# Patient Record
Sex: MALE | Race: BLACK OR AFRICAN AMERICAN | NOT HISPANIC OR LATINO
[De-identification: names, ages, dates, MRNs, and addresses within clinical notes are randomized per-mention and may not be internally consistent; named-entity substitution may affect disease eponyms.]

---

## 2017-03-15 ENCOUNTER — TRANSCRIPTION ENCOUNTER (OUTPATIENT)
Age: 37
End: 2017-03-15

## 2017-07-25 ENCOUNTER — TRANSCRIPTION ENCOUNTER (OUTPATIENT)
Age: 37
End: 2017-07-25

## 2019-04-20 ENCOUNTER — EMERGENCY (EMERGENCY)
Facility: HOSPITAL | Age: 39
LOS: 1 days | Discharge: HOME | End: 2019-04-20
Payer: COMMERCIAL

## 2019-04-20 VITALS
SYSTOLIC BLOOD PRESSURE: 150 MMHG | RESPIRATION RATE: 14 BRPM | HEART RATE: 77 BPM | TEMPERATURE: 99 F | OXYGEN SATURATION: 99 % | DIASTOLIC BLOOD PRESSURE: 100 MMHG

## 2019-04-20 LAB
ALBUMIN SERPL ELPH-MCNC: 4.3 G/DL — SIGNIFICANT CHANGE UP (ref 3.3–5)
ALP SERPL-CCNC: 36 U/L — LOW (ref 40–120)
ALT FLD-CCNC: 129 U/L — HIGH (ref 10–45)
ANION GAP SERPL CALC-SCNC: 12 MMOL/L — SIGNIFICANT CHANGE UP (ref 5–17)
AST SERPL-CCNC: 99 U/L — HIGH (ref 10–40)
BASOPHILS # BLD AUTO: 0.1 K/UL — SIGNIFICANT CHANGE UP (ref 0–0.2)
BASOPHILS NFR BLD AUTO: 0.9 % — SIGNIFICANT CHANGE UP (ref 0–2)
BILIRUB SERPL-MCNC: 0.6 MG/DL — SIGNIFICANT CHANGE UP (ref 0.2–1.2)
BUN SERPL-MCNC: 7 MG/DL — SIGNIFICANT CHANGE UP (ref 7–23)
CALCIUM SERPL-MCNC: 9.5 MG/DL — SIGNIFICANT CHANGE UP (ref 8.4–10.5)
CHLORIDE SERPL-SCNC: 102 MMOL/L — SIGNIFICANT CHANGE UP (ref 96–108)
CO2 SERPL-SCNC: 22 MMOL/L — SIGNIFICANT CHANGE UP (ref 22–31)
CREAT SERPL-MCNC: 0.78 MG/DL — SIGNIFICANT CHANGE UP (ref 0.5–1.3)
EOSINOPHIL # BLD AUTO: 0.3 K/UL — SIGNIFICANT CHANGE UP (ref 0–0.5)
EOSINOPHIL NFR BLD AUTO: 4.1 % — SIGNIFICANT CHANGE UP (ref 0–6)
GLUCOSE SERPL-MCNC: 127 MG/DL — HIGH (ref 70–99)
HCT VFR BLD CALC: 41.9 % — SIGNIFICANT CHANGE UP (ref 39–50)
HGB BLD-MCNC: 13.9 G/DL — SIGNIFICANT CHANGE UP (ref 13–17)
LYMPHOCYTES # BLD AUTO: 2.4 K/UL — SIGNIFICANT CHANGE UP (ref 1–3.3)
LYMPHOCYTES # BLD AUTO: 36.4 % — SIGNIFICANT CHANGE UP (ref 13–44)
MCHC RBC-ENTMCNC: 32.2 PG — SIGNIFICANT CHANGE UP (ref 27–34)
MCHC RBC-ENTMCNC: 33.1 GM/DL — SIGNIFICANT CHANGE UP (ref 32–36)
MCV RBC AUTO: 97.4 FL — SIGNIFICANT CHANGE UP (ref 80–100)
MONOCYTES # BLD AUTO: 0.5 K/UL — SIGNIFICANT CHANGE UP (ref 0–0.9)
MONOCYTES NFR BLD AUTO: 7.3 % — SIGNIFICANT CHANGE UP (ref 2–14)
NEUTROPHILS # BLD AUTO: 3.4 K/UL — SIGNIFICANT CHANGE UP (ref 1.8–7.4)
NEUTROPHILS NFR BLD AUTO: 51.3 % — SIGNIFICANT CHANGE UP (ref 43–77)
PLATELET # BLD AUTO: 223 K/UL — SIGNIFICANT CHANGE UP (ref 150–400)
POTASSIUM SERPL-MCNC: 3.6 MMOL/L — SIGNIFICANT CHANGE UP (ref 3.5–5.3)
POTASSIUM SERPL-SCNC: 3.6 MMOL/L — SIGNIFICANT CHANGE UP (ref 3.5–5.3)
PROT SERPL-MCNC: 7.4 G/DL — SIGNIFICANT CHANGE UP (ref 6–8.3)
RBC # BLD: 4.29 M/UL — SIGNIFICANT CHANGE UP (ref 4.2–5.8)
RBC # FLD: 12.7 % — SIGNIFICANT CHANGE UP (ref 10.3–14.5)
SODIUM SERPL-SCNC: 136 MMOL/L — SIGNIFICANT CHANGE UP (ref 135–145)
WBC # BLD: 6.7 K/UL — SIGNIFICANT CHANGE UP (ref 3.8–10.5)
WBC # FLD AUTO: 6.7 K/UL — SIGNIFICANT CHANGE UP (ref 3.8–10.5)

## 2019-04-20 PROCEDURE — 71250 CT THORAX DX C-: CPT | Mod: 26

## 2019-04-20 PROCEDURE — 93010 ELECTROCARDIOGRAM REPORT: CPT

## 2019-04-20 PROCEDURE — 99285 EMERGENCY DEPT VISIT HI MDM: CPT | Mod: 25

## 2019-04-20 RX ORDER — MORPHINE SULFATE 50 MG/1
8 CAPSULE, EXTENDED RELEASE ORAL ONCE
Qty: 0 | Refills: 0 | Status: DISCONTINUED | OUTPATIENT
Start: 2019-04-20 | End: 2019-04-20

## 2019-04-20 RX ORDER — KETOROLAC TROMETHAMINE 30 MG/ML
30 SYRINGE (ML) INJECTION ONCE
Qty: 0 | Refills: 0 | Status: DISCONTINUED | OUTPATIENT
Start: 2019-04-20 | End: 2019-04-20

## 2019-04-20 RX ORDER — MORPHINE SULFATE 50 MG/1
4 CAPSULE, EXTENDED RELEASE ORAL ONCE
Qty: 0 | Refills: 0 | Status: DISCONTINUED | OUTPATIENT
Start: 2019-04-20 | End: 2019-04-20

## 2019-04-20 RX ORDER — LIDOCAINE 4 G/100G
1 CREAM TOPICAL ONCE
Qty: 0 | Refills: 0 | Status: COMPLETED | OUTPATIENT
Start: 2019-04-20 | End: 2019-04-20

## 2019-04-20 RX ORDER — IBUPROFEN 200 MG
600 TABLET ORAL ONCE
Qty: 0 | Refills: 0 | Status: COMPLETED | OUTPATIENT
Start: 2019-04-20 | End: 2019-04-20

## 2019-04-20 RX ADMIN — Medication 30 MILLIGRAM(S): at 21:59

## 2019-04-20 RX ADMIN — LIDOCAINE 1 PATCH: 4 CREAM TOPICAL at 21:44

## 2019-04-20 RX ADMIN — MORPHINE SULFATE 4 MILLIGRAM(S): 50 CAPSULE, EXTENDED RELEASE ORAL at 20:15

## 2019-04-20 RX ADMIN — MORPHINE SULFATE 4 MILLIGRAM(S): 50 CAPSULE, EXTENDED RELEASE ORAL at 20:00

## 2019-04-20 RX ADMIN — Medication 30 MILLIGRAM(S): at 21:47

## 2019-04-20 RX ADMIN — MORPHINE SULFATE 8 MILLIGRAM(S): 50 CAPSULE, EXTENDED RELEASE ORAL at 22:00

## 2019-04-20 RX ADMIN — MORPHINE SULFATE 8 MILLIGRAM(S): 50 CAPSULE, EXTENDED RELEASE ORAL at 21:42

## 2019-04-20 NOTE — ED PROVIDER NOTE - NSFOLLOWUPINSTRUCTIONS_ED_ALL_ED_FT
1) You suffered from a broken rib 8th rib on the left side most likely as a result of your significant cough. Please continue to control your pain with tylenol and ibuprofen. If you are not getting relief with that please take oxycodone 1 pill, do not take more than 10 mg oxycodone within 1 day.  2) Please continue to use the incentive spirometer daily to improve your pain symptoms  3) Please follow up with your PCP within 1 week from discharge. If you develop shortness of breath, worsening chest pain please return to the ED for further evaluation.

## 2019-04-20 NOTE — ED PROVIDER NOTE - NS ED ROS FT
CONSTITUTIONAL: No weakness, fevers or chills  EYES/ENT: No visual changes;  No vertigo or throat pain, +cough  NECK: No pain or stiffness  RESPIRATORY: No cough, wheezing, hemoptysis; No shortness of breath  CARDIOVASCULAR: +chest pain or palpitations  GASTROINTESTINAL: No abdominal or epigastric pain. No nausea, vomiting, or hematemesis; No diarrhea or constipation. No melena or hematochezia.  GENITOURINARY: No dysuria, frequency or hematuria  NEUROLOGICAL: No numbness or weakness  SKIN: No itching, burning, rashes, or lesions   All other review of systems is negative unless indicated above.

## 2019-04-20 NOTE — ED ADULT NURSE NOTE - OBJECTIVE STATEMENT
Patient is a 39 year old male complaining of L. sided mix axillary chest pain, brought on suddenly by coughing. Arrived by EMS from work. Patient has history of HTN. Patient is A&O x 4 and appears well. Pt reports he just finished a course of ABX for PNA, ha a strong cough at work and felt "a pop in the L side of his chest", felt sudden and persistent sharp 10/10 pain in the L chest and fell to the ground. Pt received 50 mcg of Fentanyl from EMS. Endorsing complaints of same pain with no change after Fentanyl. Denies complaints of sob, fevers, chills, n/v/d, headache, syncope, burning urination, blood in urine, blood in stool. Lungs are clear and equal prerna. Abd is soft, non tender, non distended. Skin is warm and dry. Color is consistent with ethnicity. VSS/ NAD. Safety and comfort maintained. No visitors at the bedside. Will continue to monitor.

## 2019-04-20 NOTE — ED PROVIDER NOTE - OBJECTIVE STATEMENT
38 yo man PMH of HTN p/w chest pain and cough. 38 yo man PMH of HTN p/w chest pain and cough. Pt in usual state of health 2 days prior, has productive cough, intermittent chills. Yesterday pt reports developing left sided chest pain, initially non-radiating, pain worsened this morning, coughed and "felt a pop" w pain radiating down left arm. Pt fell to floor, no LOC and no head trauma, at that point EMS called bringing to [facility] for evaluation. Pt denies fevers, abdominal pain, no N/V or abdominal pain, no motor or sensory deficits. No recent trauma/fall. Worsening SOB today. No leg swelling, no known hypercoagulable history, no recent surgery.

## 2019-04-20 NOTE — ED PROVIDER NOTE - CLINICAL SUMMARY MEDICAL DECISION MAKING FREE TEXT BOX
38 yo man PMH of HTN p/w chest pain in setting of recent cough, intermittent chills, no fever. Worsening left sided pain radiating down left arm w/ SOB today. On exam lungs CTABL, S1 S2, abd soft, NT, ND. EKG NSR no ST changes, CXR. Reassess.

## 2019-04-20 NOTE — ED PROVIDER NOTE - ATTENDING CONTRIBUTION TO CARE
MD Ivy:  patient seen and evaluated with the resident.  I was present for key portions of the History & Physical, and I agree with the Impression & Plan.  MD Ivy:  40 yo M c/o sudden-onset L sided CP.  Reports being Tx'd for PNA; finished abx 4d ago.  Quality of pain: sharp and pleuritic.  Context:  I've had a cough for 2 weeks, and I felt a pop in the L chest with subsequent severe pain.  Associated Sx:  mild SOB, but no f/c, no n/v, no back pain, no HA.    VS:  O2 sat 92% on RA.  VS: wnl.  Physical Exam: adult M, uncomfortable appearing, NCAT, PERRL, EOMI, neck supple, RRR, CTA B, Abd: s/nd/nt, Ext: no edema, Neuro: AAOx3, ambulates w/o diff, strength 5/5 & symmetric throughout.  Impression:  DDx includes spontaneous PTX, recurrent PNA, rib Fx, >>> PE (but no PE RFs).  Plan:  cxr, labs, pain meds, ECG (unremarkable).  CT chest non-con if CXR wnl.

## 2019-04-21 VITALS
DIASTOLIC BLOOD PRESSURE: 96 MMHG | SYSTOLIC BLOOD PRESSURE: 142 MMHG | HEART RATE: 80 BPM | RESPIRATION RATE: 18 BRPM | OXYGEN SATURATION: 100 %

## 2019-04-21 PROCEDURE — 71250 CT THORAX DX C-: CPT

## 2019-04-21 PROCEDURE — 96376 TX/PRO/DX INJ SAME DRUG ADON: CPT

## 2019-04-21 PROCEDURE — 85027 COMPLETE CBC AUTOMATED: CPT

## 2019-04-21 PROCEDURE — 96374 THER/PROPH/DIAG INJ IV PUSH: CPT

## 2019-04-21 PROCEDURE — 96375 TX/PRO/DX INJ NEW DRUG ADDON: CPT

## 2019-04-21 PROCEDURE — 93005 ELECTROCARDIOGRAM TRACING: CPT

## 2019-04-21 PROCEDURE — 80053 COMPREHEN METABOLIC PANEL: CPT

## 2019-04-21 PROCEDURE — 99284 EMERGENCY DEPT VISIT MOD MDM: CPT | Mod: 25

## 2019-04-21 RX ORDER — OXYCODONE HYDROCHLORIDE 5 MG/1
5 TABLET ORAL ONCE
Qty: 0 | Refills: 0 | Status: DISCONTINUED | OUTPATIENT
Start: 2019-04-21 | End: 2019-04-21

## 2019-04-21 RX ORDER — LIDOCAINE 4 G/100G
1 CREAM TOPICAL
Qty: 1 | Refills: 0
Start: 2019-04-21 | End: 2019-04-25

## 2019-04-21 RX ORDER — OXYCODONE HYDROCHLORIDE 5 MG/1
1 TABLET ORAL
Qty: 6 | Refills: 0
Start: 2019-04-21 | End: 2019-04-23

## 2019-04-21 RX ADMIN — Medication 600 MILLIGRAM(S): at 00:20

## 2019-04-21 RX ADMIN — Medication 600 MILLIGRAM(S): at 01:34

## 2019-04-21 RX ADMIN — OXYCODONE HYDROCHLORIDE 5 MILLIGRAM(S): 5 TABLET ORAL at 01:35

## 2019-05-14 ENCOUNTER — APPOINTMENT (OUTPATIENT)
Dept: FAMILY MEDICINE | Facility: CLINIC | Age: 39
End: 2019-05-14

## 2019-05-14 PROBLEM — Z00.00 ENCOUNTER FOR PREVENTIVE HEALTH EXAMINATION: Status: ACTIVE | Noted: 2019-05-14

## 2019-05-15 ENCOUNTER — APPOINTMENT (OUTPATIENT)
Dept: FAMILY MEDICINE | Facility: CLINIC | Age: 39
End: 2019-05-15

## 2019-05-15 ENCOUNTER — APPOINTMENT (OUTPATIENT)
Dept: FAMILY MEDICINE | Facility: CLINIC | Age: 39
End: 2019-05-15
Payer: COMMERCIAL

## 2019-05-15 VITALS — SYSTOLIC BLOOD PRESSURE: 135 MMHG | DIASTOLIC BLOOD PRESSURE: 84 MMHG | HEART RATE: 86 BPM

## 2019-05-15 VITALS — TEMPERATURE: 99.7 F | HEIGHT: 69 IN | BODY MASS INDEX: 34.96 KG/M2 | WEIGHT: 236 LBS

## 2019-05-15 DIAGNOSIS — S22.39XA FRACTURE OF ONE RIB, UNSPECIFIED SIDE, INITIAL ENCOUNTER FOR CLOSED FRACTURE: ICD-10-CM

## 2019-05-15 PROCEDURE — 99213 OFFICE O/P EST LOW 20 MIN: CPT

## 2019-05-15 RX ORDER — PREDNISONE 10 MG/1
10 TABLET ORAL
Qty: 15 | Refills: 0 | Status: DISCONTINUED | COMMUNITY
Start: 2019-01-29

## 2019-05-15 RX ORDER — FEXOFENADINE HYDROCHLORIDE 180 MG/1
180 TABLET ORAL
Qty: 30 | Refills: 0 | Status: DISCONTINUED | COMMUNITY
Start: 2019-01-29

## 2019-05-15 RX ORDER — AMOXICILLIN 500 MG/1
500 CAPSULE ORAL
Qty: 21 | Refills: 0 | Status: DISCONTINUED | COMMUNITY
Start: 2019-03-13

## 2019-05-15 RX ORDER — ALBUTEROL SULFATE 90 UG/1
108 (90 BASE) AEROSOL, METERED RESPIRATORY (INHALATION)
Qty: 8 | Refills: 0 | Status: DISCONTINUED | COMMUNITY
Start: 2019-01-29

## 2019-05-15 RX ORDER — OXYCODONE 5 MG/1
5 TABLET ORAL
Qty: 6 | Refills: 0 | Status: DISCONTINUED | COMMUNITY
Start: 2019-04-21

## 2019-05-15 RX ORDER — LISINOPRIL AND HYDROCHLOROTHIAZIDE TABLETS 20; 12.5 MG/1; MG/1
20-12.5 TABLET ORAL
Qty: 30 | Refills: 0 | Status: DISCONTINUED | COMMUNITY
Start: 2019-03-25

## 2019-05-15 RX ORDER — TRAMADOL HYDROCHLORIDE AND ACETAMINOPHEN 37.5; 325 MG/1; MG/1
37.5-325 TABLET, FILM COATED ORAL
Qty: 50 | Refills: 0 | Status: DISCONTINUED | COMMUNITY
Start: 2019-04-23

## 2019-05-15 RX ORDER — AZITHROMYCIN 250 MG/1
250 TABLET, FILM COATED ORAL
Qty: 6 | Refills: 0 | Status: DISCONTINUED | COMMUNITY
Start: 2019-01-29

## 2019-05-15 RX ORDER — HYDROCODONE BITARTRATE AND ACETAMINOPHEN 5; 325 MG/1; MG/1
5-325 TABLET ORAL
Qty: 12 | Refills: 0 | Status: DISCONTINUED | COMMUNITY
Start: 2019-03-13

## 2019-05-15 NOTE — PLAN
[FreeTextEntry1] : pt may go back to work but light duty x2weeks but xray negative but mri according to pt was positive\par

## 2019-05-15 NOTE — HISTORY OF PRESENT ILLNESS
[de-identified] : pt fracture left 8th rib decreasing pain but suffering from numbness around left 8th rib [FreeTextEntry1] : pt is here for result and note  to go back to work

## 2019-05-15 NOTE — PHYSICAL EXAM
[Well Developed] : well developed [No Acute Distress] : no acute distress [Well Nourished] : well nourished [PERRL] : pupils equal round and reactive to light [Normal Sclera/Conjunctiva] : normal sclera/conjunctiva [Well-Appearing] : well-appearing [Normal Outer Ear/Nose] : the outer ears and nose were normal in appearance [EOMI] : extraocular movements intact [Normal Oropharynx] : the oropharynx was normal [No Lymphadenopathy] : no lymphadenopathy [No JVD] : no jugular venous distention [Supple] : supple [Clear to Auscultation] : lungs were clear to auscultation bilaterally [No Respiratory Distress] : no respiratory distress  [Thyroid Normal, No Nodules] : the thyroid was normal and there were no nodules present [No Accessory Muscle Use] : no accessory muscle use [Normal Rate] : normal rate  [Regular Rhythm] : with a regular rhythm [Normal S1, S2] : normal S1 and S2 [No Carotid Bruits] : no carotid bruits [No Murmur] : no murmur heard [No Varicosities] : no varicosities [Pedal Pulses Present] : the pedal pulses are present [No Abdominal Bruit] : a ~M bruit was not heard ~T in the abdomen [No Edema] : there was no peripheral edema [No Extremity Clubbing/Cyanosis] : no extremity clubbing/cyanosis [No Palpable Aorta] : no palpable aorta [Soft] : abdomen soft [Non Tender] : non-tender [Non-distended] : non-distended [No Masses] : no abdominal mass palpated [No HSM] : no HSM [Normal Bowel Sounds] : normal bowel sounds [Normal Anterior Cervical Nodes] : no anterior cervical lymphadenopathy [No CVA Tenderness] : no CVA  tenderness [Normal Posterior Cervical Nodes] : no posterior cervical lymphadenopathy [No Joint Swelling] : no joint swelling [Grossly Normal Strength/Tone] : grossly normal strength/tone [No Spinal Tenderness] : no spinal tenderness [Normal Gait] : normal gait [No Rash] : no rash [Coordination Grossly Intact] : coordination grossly intact [No Focal Deficits] : no focal deficits [Deep Tendon Reflexes (DTR)] : deep tendon reflexes were 2+ and symmetric [Normal Affect] : the affect was normal [Normal Insight/Judgement] : insight and judgment were intact

## 2019-05-20 ENCOUNTER — TRANSCRIPTION ENCOUNTER (OUTPATIENT)
Age: 39
End: 2019-05-20

## 2019-07-03 ENCOUNTER — APPOINTMENT (OUTPATIENT)
Dept: FAMILY MEDICINE | Facility: CLINIC | Age: 39
End: 2019-07-03
Payer: COMMERCIAL

## 2019-07-03 VITALS
SYSTOLIC BLOOD PRESSURE: 128 MMHG | WEIGHT: 236 LBS | DIASTOLIC BLOOD PRESSURE: 80 MMHG | HEART RATE: 118 BPM | BODY MASS INDEX: 34.96 KG/M2 | TEMPERATURE: 97.8 F | HEIGHT: 69 IN | OXYGEN SATURATION: 96 %

## 2019-07-03 DIAGNOSIS — R05 COUGH: ICD-10-CM

## 2019-07-03 DIAGNOSIS — Z87.09 PERSONAL HISTORY OF OTHER DISEASES OF THE RESPIRATORY SYSTEM: ICD-10-CM

## 2019-07-03 PROCEDURE — 99214 OFFICE O/P EST MOD 30 MIN: CPT

## 2019-07-03 RX ORDER — MONTELUKAST 10 MG/1
10 TABLET, FILM COATED ORAL
Qty: 90 | Refills: 0 | Status: ACTIVE | COMMUNITY
Start: 2019-07-03 | End: 1900-01-01

## 2019-07-03 RX ORDER — ALBUTEROL SULFATE 90 UG/1
108 (90 BASE) AEROSOL, METERED RESPIRATORY (INHALATION)
Qty: 1 | Refills: 0 | Status: ACTIVE | COMMUNITY
Start: 2019-07-03 | End: 1900-01-01

## 2019-07-03 RX ORDER — AZITHROMYCIN 250 MG/1
250 TABLET, FILM COATED ORAL
Qty: 1 | Refills: 0 | Status: ACTIVE | COMMUNITY
Start: 2019-07-03 | End: 1900-01-01

## 2019-07-03 NOTE — PHYSICAL EXAM
[No Acute Distress] : no acute distress [Well Nourished] : well nourished [Well Developed] : well developed [Well-Appearing] : well-appearing [Normal Sclera/Conjunctiva] : normal sclera/conjunctiva [PERRL] : pupils equal round and reactive to light [EOMI] : extraocular movements intact [Normal Outer Ear/Nose] : the outer ears and nose were normal in appearance [Normal Oropharynx] : the oropharynx was normal [No JVD] : no jugular venous distention [No Lymphadenopathy] : no lymphadenopathy [Thyroid Normal, No Nodules] : the thyroid was normal and there were no nodules present [Supple] : supple [No Respiratory Distress] : no respiratory distress  [No Accessory Muscle Use] : no accessory muscle use [Clear to Auscultation] : lungs were clear to auscultation bilaterally [Regular Rhythm] : with a regular rhythm [Normal Rate] : normal rate  [Normal S1, S2] : normal S1 and S2 [No Carotid Bruits] : no carotid bruits [No Murmur] : no murmur heard [No Abdominal Bruit] : a ~M bruit was not heard ~T in the abdomen [No Varicosities] : no varicosities [Pedal Pulses Present] : the pedal pulses are present [No Palpable Aorta] : no palpable aorta [No Edema] : there was no peripheral edema [No Extremity Clubbing/Cyanosis] : no extremity clubbing/cyanosis [Soft] : abdomen soft [Non Tender] : non-tender [No Masses] : no abdominal mass palpated [Non-distended] : non-distended [No HSM] : no HSM [Normal Bowel Sounds] : normal bowel sounds [Normal Posterior Cervical Nodes] : no posterior cervical lymphadenopathy [Normal Anterior Cervical Nodes] : no anterior cervical lymphadenopathy [No CVA Tenderness] : no CVA  tenderness [No Spinal Tenderness] : no spinal tenderness [No Joint Swelling] : no joint swelling [Grossly Normal Strength/Tone] : grossly normal strength/tone [No Rash] : no rash [Coordination Grossly Intact] : coordination grossly intact [No Focal Deficits] : no focal deficits [Normal Gait] : normal gait [Deep Tendon Reflexes (DTR)] : deep tendon reflexes were 2+ and symmetric [Normal Affect] : the affect was normal [Normal Insight/Judgement] : insight and judgment were intact

## 2019-07-03 NOTE — HISTORY OF PRESENT ILLNESS
[FreeTextEntry8] : pt has prior rib pain and now a cough for 5 day\par History of fractured ribs April 2019, here today complaints of coughing for 5 days, wheezing no fever

## 2020-01-24 NOTE — ED ADULT NURSE NOTE - CAS DISCH TRANSFER METHOD
[History reviewed] : History reviewed. [Medications and Allergies reviewed] : Medications and allergies reviewed. Private car

## 2020-02-14 ENCOUNTER — TRANSCRIPTION ENCOUNTER (OUTPATIENT)
Age: 40
End: 2020-02-14

## 2020-04-29 ENCOUNTER — APPOINTMENT (OUTPATIENT)
Dept: FAMILY MEDICINE | Facility: CLINIC | Age: 40
End: 2020-04-29

## 2020-12-21 PROBLEM — Z87.09 HISTORY OF UPPER RESPIRATORY INFECTION: Status: RESOLVED | Noted: 2019-07-03 | Resolved: 2020-12-21

## 2021-05-12 ENCOUNTER — INPATIENT (INPATIENT)
Facility: HOSPITAL | Age: 41
LOS: 2 days | Discharge: HOME | DRG: 639 | End: 2021-05-15
Attending: HOSPITALIST | Admitting: INTERNAL MEDICINE
Payer: COMMERCIAL

## 2021-05-12 ENCOUNTER — APPOINTMENT (OUTPATIENT)
Dept: FAMILY MEDICINE | Facility: CLINIC | Age: 41
End: 2021-05-12
Payer: COMMERCIAL

## 2021-05-12 VITALS — DIASTOLIC BLOOD PRESSURE: 75 MMHG | HEART RATE: 100 BPM | SYSTOLIC BLOOD PRESSURE: 120 MMHG

## 2021-05-12 VITALS
WEIGHT: 253.09 LBS | SYSTOLIC BLOOD PRESSURE: 126 MMHG | TEMPERATURE: 98 F | HEIGHT: 69 IN | DIASTOLIC BLOOD PRESSURE: 88 MMHG | RESPIRATION RATE: 18 BRPM | OXYGEN SATURATION: 99 % | HEART RATE: 99 BPM

## 2021-05-12 VITALS
WEIGHT: 258.4 LBS | HEART RATE: 120 BPM | BODY MASS INDEX: 38.27 KG/M2 | OXYGEN SATURATION: 98 % | TEMPERATURE: 97.7 F | HEIGHT: 69 IN

## 2021-05-12 DIAGNOSIS — E11.49 TYPE 2 DIABETES MELLITUS WITH OTHER DIABETIC NEUROLOGICAL COMPLICATION: ICD-10-CM

## 2021-05-12 DIAGNOSIS — R53.83 OTHER FATIGUE: ICD-10-CM

## 2021-05-12 DIAGNOSIS — E11.10 TYPE 2 DIABETES MELLITUS WITH KETOACIDOSIS WITHOUT COMA: ICD-10-CM

## 2021-05-12 DIAGNOSIS — E11.9 TYPE 2 DIABETES MELLITUS W/OUT COMPLICATIONS: ICD-10-CM

## 2021-05-12 LAB
A1C WITH ESTIMATED AVERAGE GLUCOSE RESULT: 12.5 % — HIGH (ref 4–5.6)
ACETONE SERPL-MCNC: ABNORMAL
ALBUMIN SERPL ELPH-MCNC: 5 G/DL — SIGNIFICANT CHANGE UP (ref 3.3–5.2)
ALP SERPL-CCNC: 66 U/L — SIGNIFICANT CHANGE UP (ref 40–120)
ALT FLD-CCNC: 54 U/L — HIGH
ANION GAP SERPL CALC-SCNC: 24 MMOL/L — HIGH (ref 5–17)
APPEARANCE UR: CLEAR — SIGNIFICANT CHANGE UP
AST SERPL-CCNC: 47 U/L — HIGH
BACTERIA # UR AUTO: ABNORMAL
BASE EXCESS BLDV CALC-SCNC: -3.8 MMOL/L — LOW (ref -2–2)
BASOPHILS # BLD AUTO: 0.12 K/UL — SIGNIFICANT CHANGE UP (ref 0–0.2)
BASOPHILS NFR BLD AUTO: 1.6 % — SIGNIFICANT CHANGE UP (ref 0–2)
BILIRUB SERPL-MCNC: 0.8 MG/DL — SIGNIFICANT CHANGE UP (ref 0.4–2)
BILIRUB UR-MCNC: NEGATIVE — SIGNIFICANT CHANGE UP
BUN SERPL-MCNC: 16 MG/DL — SIGNIFICANT CHANGE UP (ref 8–20)
CA-I SERPL-SCNC: 1.19 MMOL/L — SIGNIFICANT CHANGE UP (ref 1.15–1.33)
CALCIUM SERPL-MCNC: 10.4 MG/DL — HIGH (ref 8.6–10.2)
CHLORIDE BLDV-SCNC: 80 MMOL/L — LOW (ref 98–107)
CHLORIDE SERPL-SCNC: 77 MMOL/L — LOW (ref 98–107)
CO2 SERPL-SCNC: 19 MMOL/L — LOW (ref 22–29)
COLOR SPEC: YELLOW — SIGNIFICANT CHANGE UP
CREAT SERPL-MCNC: 1.16 MG/DL — SIGNIFICANT CHANGE UP (ref 0.5–1.3)
DIFF PNL FLD: NEGATIVE — SIGNIFICANT CHANGE UP
EOSINOPHIL # BLD AUTO: 0.13 K/UL — SIGNIFICANT CHANGE UP (ref 0–0.5)
EOSINOPHIL NFR BLD AUTO: 1.7 % — SIGNIFICANT CHANGE UP (ref 0–6)
EPI CELLS # UR: SIGNIFICANT CHANGE UP
ESTIMATED AVERAGE GLUCOSE: 312 MG/DL — HIGH (ref 68–114)
GAS PNL BLDV: 125 MMOL/L — LOW (ref 135–145)
GAS PNL BLDV: SIGNIFICANT CHANGE UP
GAS PNL BLDV: SIGNIFICANT CHANGE UP
GLUCOSE BLDC GLUCOMTR-MCNC: 282 MG/DL — HIGH (ref 70–99)
GLUCOSE BLDC GLUCOMTR-MCNC: 325 MG/DL — HIGH (ref 70–99)
GLUCOSE BLDC GLUCOMTR-MCNC: 360 MG/DL — HIGH (ref 70–99)
GLUCOSE BLDV-MCNC: 706 MG/DL — CRITICAL HIGH (ref 70–99)
GLUCOSE SERPL-MCNC: 683 MG/DL — CRITICAL HIGH (ref 70–99)
GLUCOSE UR QL: 1000 MG/DL
HCO3 BLDV-SCNC: 21 MMOL/L — SIGNIFICANT CHANGE UP (ref 20–26)
HCT VFR BLD CALC: 45.2 % — SIGNIFICANT CHANGE UP (ref 39–50)
HCT VFR BLDA CALC: 50 — SIGNIFICANT CHANGE UP (ref 39–50)
HGB BLD CALC-MCNC: 16.2 G/DL — SIGNIFICANT CHANGE UP (ref 13–17)
HGB BLD-MCNC: 16 G/DL — SIGNIFICANT CHANGE UP (ref 13–17)
IMM GRANULOCYTES NFR BLD AUTO: 0.1 % — SIGNIFICANT CHANGE UP (ref 0–1.5)
KETONES UR-MCNC: ABNORMAL
LACTATE BLDV-MCNC: 2.1 MMOL/L — HIGH (ref 0.5–2)
LEUKOCYTE ESTERASE UR-ACNC: NEGATIVE — SIGNIFICANT CHANGE UP
LIDOCAIN IGE QN: 51 U/L — SIGNIFICANT CHANGE UP (ref 22–51)
LYMPHOCYTES # BLD AUTO: 2.89 K/UL — SIGNIFICANT CHANGE UP (ref 1–3.3)
LYMPHOCYTES # BLD AUTO: 38.2 % — SIGNIFICANT CHANGE UP (ref 13–44)
MAGNESIUM SERPL-MCNC: 2.7 MG/DL — HIGH (ref 1.6–2.6)
MCHC RBC-ENTMCNC: 33.7 PG — SIGNIFICANT CHANGE UP (ref 27–34)
MCHC RBC-ENTMCNC: 35.4 GM/DL — SIGNIFICANT CHANGE UP (ref 32–36)
MCV RBC AUTO: 95.2 FL — SIGNIFICANT CHANGE UP (ref 80–100)
MONOCYTES # BLD AUTO: 0.79 K/UL — SIGNIFICANT CHANGE UP (ref 0–0.9)
MONOCYTES NFR BLD AUTO: 10.4 % — SIGNIFICANT CHANGE UP (ref 2–14)
NEUTROPHILS # BLD AUTO: 3.63 K/UL — SIGNIFICANT CHANGE UP (ref 1.8–7.4)
NEUTROPHILS NFR BLD AUTO: 48 % — SIGNIFICANT CHANGE UP (ref 43–77)
NITRITE UR-MCNC: NEGATIVE — SIGNIFICANT CHANGE UP
OTHER CELLS CSF MANUAL: 20 ML/DL — SIGNIFICANT CHANGE UP (ref 18–22)
PCO2 BLDV: 46 MMHG — SIGNIFICANT CHANGE UP (ref 35–50)
PH BLDV: 7.3 — LOW (ref 7.32–7.43)
PH UR: 5 — SIGNIFICANT CHANGE UP (ref 5–8)
PLATELET # BLD AUTO: 246 K/UL — SIGNIFICANT CHANGE UP (ref 150–400)
PO2 BLDV: 62 MMHG — HIGH (ref 25–45)
POTASSIUM BLDV-SCNC: 5.1 MMOL/L — HIGH (ref 3.4–4.5)
POTASSIUM SERPL-MCNC: 4.9 MMOL/L — SIGNIFICANT CHANGE UP (ref 3.5–5.3)
POTASSIUM SERPL-SCNC: 4.9 MMOL/L — SIGNIFICANT CHANGE UP (ref 3.5–5.3)
PROT SERPL-MCNC: 8.9 G/DL — HIGH (ref 6.6–8.7)
PROT UR-MCNC: 15 MG/DL
RBC # BLD: 4.75 M/UL — SIGNIFICANT CHANGE UP (ref 4.2–5.8)
RBC # FLD: 11.5 % — SIGNIFICANT CHANGE UP (ref 10.3–14.5)
RBC CASTS # UR COMP ASSIST: SIGNIFICANT CHANGE UP /HPF (ref 0–4)
SAO2 % BLDV: 89 % — SIGNIFICANT CHANGE UP
SARS-COV-2 RNA SPEC QL NAA+PROBE: SIGNIFICANT CHANGE UP
SODIUM SERPL-SCNC: 120 MMOL/L — CRITICAL LOW (ref 135–145)
SP GR SPEC: 1.01 — SIGNIFICANT CHANGE UP (ref 1.01–1.02)
UROBILINOGEN FLD QL: NEGATIVE MG/DL — SIGNIFICANT CHANGE UP
WBC # BLD: 7.57 K/UL — SIGNIFICANT CHANGE UP (ref 3.8–10.5)
WBC # FLD AUTO: 7.57 K/UL — SIGNIFICANT CHANGE UP (ref 3.8–10.5)
WBC UR QL: SIGNIFICANT CHANGE UP

## 2021-05-12 PROCEDURE — 99072 ADDL SUPL MATRL&STAF TM PHE: CPT

## 2021-05-12 PROCEDURE — 99214 OFFICE O/P EST MOD 30 MIN: CPT

## 2021-05-12 PROCEDURE — 93010 ELECTROCARDIOGRAM REPORT: CPT

## 2021-05-12 PROCEDURE — 71045 X-RAY EXAM CHEST 1 VIEW: CPT | Mod: 26

## 2021-05-12 PROCEDURE — 99285 EMERGENCY DEPT VISIT HI MDM: CPT

## 2021-05-12 RX ORDER — INSULIN HUMAN 100 [IU]/ML
5 INJECTION, SOLUTION SUBCUTANEOUS
Qty: 100 | Refills: 0 | Status: DISCONTINUED | OUTPATIENT
Start: 2021-05-12 | End: 2021-05-13

## 2021-05-12 RX ORDER — SODIUM CHLORIDE 9 MG/ML
3000 INJECTION, SOLUTION INTRAVENOUS ONCE
Refills: 0 | Status: COMPLETED | OUTPATIENT
Start: 2021-05-12 | End: 2021-05-12

## 2021-05-12 RX ORDER — ENOXAPARIN SODIUM 100 MG/ML
40 INJECTION SUBCUTANEOUS DAILY
Refills: 0 | Status: DISCONTINUED | OUTPATIENT
Start: 2021-05-12 | End: 2021-05-15

## 2021-05-12 RX ORDER — CHLORHEXIDINE GLUCONATE 213 G/1000ML
1 SOLUTION TOPICAL
Refills: 0 | Status: DISCONTINUED | OUTPATIENT
Start: 2021-05-12 | End: 2021-05-15

## 2021-05-12 RX ORDER — SODIUM CHLORIDE 9 MG/ML
1000 INJECTION, SOLUTION INTRAVENOUS
Refills: 0 | Status: DISCONTINUED | OUTPATIENT
Start: 2021-05-12 | End: 2021-05-13

## 2021-05-12 RX ORDER — INSULIN HUMAN 100 [IU]/ML
10 INJECTION, SOLUTION SUBCUTANEOUS ONCE
Refills: 0 | Status: COMPLETED | OUTPATIENT
Start: 2021-05-12 | End: 2021-05-12

## 2021-05-12 RX ADMIN — INSULIN HUMAN 11 UNIT(S)/HR: 100 INJECTION, SOLUTION SUBCUTANEOUS at 20:16

## 2021-05-12 RX ADMIN — INSULIN HUMAN 4 UNIT(S)/HR: 100 INJECTION, SOLUTION SUBCUTANEOUS at 23:11

## 2021-05-12 RX ADMIN — SODIUM CHLORIDE 200 MILLILITER(S): 9 INJECTION, SOLUTION INTRAVENOUS at 21:17

## 2021-05-12 RX ADMIN — INSULIN HUMAN 10 UNIT(S): 100 INJECTION, SOLUTION SUBCUTANEOUS at 20:16

## 2021-05-12 RX ADMIN — INSULIN HUMAN 8 UNIT(S)/HR: 100 INJECTION, SOLUTION SUBCUTANEOUS at 21:18

## 2021-05-12 RX ADMIN — SODIUM CHLORIDE 200 MILLILITER(S): 9 INJECTION, SOLUTION INTRAVENOUS at 23:11

## 2021-05-12 RX ADMIN — SODIUM CHLORIDE 3000 MILLILITER(S): 9 INJECTION, SOLUTION INTRAVENOUS at 19:17

## 2021-05-12 RX ADMIN — SODIUM CHLORIDE 3000 MILLILITER(S): 9 INJECTION, SOLUTION INTRAVENOUS at 20:10

## 2021-05-12 RX ADMIN — CHLORHEXIDINE GLUCONATE 1 APPLICATION(S): 213 SOLUTION TOPICAL at 23:11

## 2021-05-12 NOTE — H&P ADULT - NSHPREVIEWOFSYSTEMS_GEN_ALL_CORE
Review of Systems:  CONSTITUTIONAL: No fever, chills, or fatigue  EYES: No eye pain, visual disturbances, or discharge  ENMT:  No difficulty hearing, tinnitus, vertigo; No sinus or throat pain  NECK: No pain or stiffness  RESPIRATORY: No cough, wheezing, chills or hemoptysis; No shortness of breath  CARDIOVASCULAR: No chest pain, palpitations, dizziness, or leg swelling  GASTROINTESTINAL: No abdominal or epigastric pain. No nausea, vomiting, or hematemesis; No diarrhea or constipation. No melena or hematochezia.  GENITOURINARY: No dysuria, (+) frequency, No hematuria, or incontinence  NEUROLOGICAL: No headaches, memory loss, loss of strength, numbness, or tremors  SKIN: No itching, burning, rashes, or lesions   MUSCULOSKELETAL: No joint pain or swelling; No muscle, back, or extremity pain  PSYCHIATRIC: No depression, anxiety, mood swings, or difficulty sleeping

## 2021-05-12 NOTE — ED ADULT NURSE NOTE - OBJECTIVE STATEMENT
Assumed care of patient from previous RN at this time.  A&Ox4, RR even and unlabored. Skin is warm, dry and intact.  OPT sent in to ED by PCP for hyperglycemia.  In office FS read >500.  Pt reports increase fatigue/weakness, thirst and urination for the last few days. +nausea without vomiting. Denies a diagnosis of DM or other PMH.  Placed on CM, in NAD.  Fluid boluses infusing.

## 2021-05-12 NOTE — H&P ADULT - HISTORY OF PRESENT ILLNESS
41 year old male Denies any PMHx ( including DM, HTN, HLD).  Presents to ED after being seen by PMD and was found to have High Blood Sugar.  Patient states had not been feeling well with Polydipsia Polyu may and blurry vision for past week.  Patient denies any other associated symptoms and denies any SOB, CP, Fevers, Chills, UTI or URI symptoms.  Denies any Hematemesis hematochezia ort diarrhea.  IN ED found to have an ANion Gap and Blood Glucose > 600.

## 2021-05-12 NOTE — ED PROVIDER NOTE - OBJECTIVE STATEMENT
42yo M with no PMH, strong FH DM, sister  from DKA, presenting with 3-4 days of not feeling well, frequent urination, increased thirst, SOB/breathing fast. no CP/cough. +nausea/vomiting. no abd pain. no diarrhea. no alcohol use. has been getting progressively worse/more tired so went to urgent care who checked FS and too high to read.

## 2021-05-12 NOTE — H&P ADULT - ASSESSMENT
41 year old male Denies any PMHx ( including DM, HTN, HLD).  Presents to ED after being seen by PMD and was found to have High Blood Sugar.      New onset Diabetes with DKA       Admit to MICU   Unlabored on RA  HDS  EKG noted with Q waves - will check cardiac markers and trend   Consistent carb clears until Anion gap closes   Aggressive hydration with isotonic crystalloid LR   When BS < 250 will buffer IVF with dextrose   Q 1hr Fingersticks until AG closaes   Insulin infusion   DM education   DVT PPX with Lovenox   COVID-19 Screen is pending   Case D/W ICU attending Dr Hamlin       CRITICAL CARE TIME SPENT: 45 minutes   (Assessing presenting problems of acute illness, which pose high probability of life threatening deterioration or end organ damage/dysfunction, as well as medical decision making including initiating plan of care, reviewing data, reviewing radiologic exams, discussing with multidisciplinary team,  discussing goals of care with patient/family, and writing this note.  Non-inclusive of procedures performed)

## 2021-05-12 NOTE — ED PROVIDER NOTE - CLINICAL SUMMARY MEDICAL DECISION MAKING FREE TEXT BOX
patient with new onset DM, will r/o DKA, likely need admission for starting insulin. labs. ekg. cxr. urine. hydration reasses

## 2021-05-12 NOTE — ED ADULT NURSE REASSESSMENT NOTE - NS ED NURSE REASSESS COMMENT FT1
PT in NAD.  Ambulated to bathroom with steady gait.  VS WNL. Denies pain or nausea.  Insulin gtt decreased to 8 unit/hr as per DAYANARA Mock's orders for new FS of 360.  Repeat blood work sent to lab.

## 2021-05-12 NOTE — ED ADULT TRIAGE NOTE - CHIEF COMPLAINT QUOTE
was sent by MD had blood work yesterday states glucose >500, increased thirst & urination, not eating for 2 days,   A&Ox3, resp wnl was sent by MD had blood work yesterday states glucose >500, increased thirst & urination, not eating for 2 days,   A&Ox3, resp wnl, denies CP

## 2021-05-12 NOTE — H&P ADULT - NSHPPHYSICALEXAM_GEN_ALL_CORE
Physical Examination:    General:  Awake,  Alert, oriented, interactive, nonfocal    HEENT: Pupils equal, reactive to light.  Symmetric. No JVD.    PULM: Clear to auscultation bilaterally, no significant sputum production    CVS: Regular rate and rhythm, no murmurs, rubs, or gallops    ABD: Soft, nondistended, nontender, normoactive bowel sounds, no masses    EXT: No edema, nontender    SKIN: Warm and well perfused, no rashes noted.

## 2021-05-12 NOTE — HISTORY OF PRESENT ILLNESS
[Chills] : chills [Fatigue] : fatigue [Cough] : no cough [Headache] : no headache [de-identified] : gonging to the bathroom all the time to urinate [FreeTextEntry8] : pt feeling tired weight loss increase urination

## 2021-05-12 NOTE — ED ADULT NURSE NOTE - CHIEF COMPLAINT QUOTE
was sent by MD had blood work yesterday states glucose >500, increased thirst & urination, not eating for 2 days,   A&Ox3, resp wnl, denies CP

## 2021-05-12 NOTE — ED PROVIDER NOTE - NS ED ROS FT
ROS: no CP +SOB. no cough. no fever. no n/v/d/c. no abd pain. no rash. no bleeding. +urinary complaints. +weakness. no vision changes. no HA. no neck/back pain. no extremity swelling/deformity. No change in mental status.

## 2021-05-13 LAB
ANION GAP SERPL CALC-SCNC: 13 MMOL/L — SIGNIFICANT CHANGE UP (ref 5–17)
ANION GAP SERPL CALC-SCNC: 14 MMOL/L — SIGNIFICANT CHANGE UP (ref 5–17)
ANION GAP SERPL CALC-SCNC: 18 MMOL/L — HIGH (ref 5–17)
BUN SERPL-MCNC: 13 MG/DL — SIGNIFICANT CHANGE UP (ref 8–20)
BUN SERPL-MCNC: 8 MG/DL — SIGNIFICANT CHANGE UP (ref 8–20)
BUN SERPL-MCNC: 9 MG/DL — SIGNIFICANT CHANGE UP (ref 8–20)
CALCIUM SERPL-MCNC: 9.7 MG/DL — SIGNIFICANT CHANGE UP (ref 8.6–10.2)
CALCIUM SERPL-MCNC: 9.8 MG/DL — SIGNIFICANT CHANGE UP (ref 8.6–10.2)
CALCIUM SERPL-MCNC: 9.9 MG/DL — SIGNIFICANT CHANGE UP (ref 8.6–10.2)
CHLORIDE SERPL-SCNC: 89 MMOL/L — LOW (ref 98–107)
CHLORIDE SERPL-SCNC: 93 MMOL/L — LOW (ref 98–107)
CHLORIDE SERPL-SCNC: 93 MMOL/L — LOW (ref 98–107)
CHOLEST SERPL-MCNC: 228 MG/DL — HIGH
CK SERPL-CCNC: 108 U/L — SIGNIFICANT CHANGE UP (ref 30–200)
CO2 SERPL-SCNC: 20 MMOL/L — LOW (ref 22–29)
CO2 SERPL-SCNC: 26 MMOL/L — SIGNIFICANT CHANGE UP (ref 22–29)
CO2 SERPL-SCNC: 27 MMOL/L — SIGNIFICANT CHANGE UP (ref 22–29)
COVID-19 SPIKE DOMAIN AB INTERP: NEGATIVE — SIGNIFICANT CHANGE UP
COVID-19 SPIKE DOMAIN ANTIBODY RESULT: 0.4 U/ML — SIGNIFICANT CHANGE UP
CREAT SERPL-MCNC: 0.78 MG/DL — SIGNIFICANT CHANGE UP (ref 0.5–1.3)
CREAT SERPL-MCNC: 0.88 MG/DL — SIGNIFICANT CHANGE UP (ref 0.5–1.3)
CREAT SERPL-MCNC: 0.89 MG/DL — SIGNIFICANT CHANGE UP (ref 0.5–1.3)
GLUCOSE BLDC GLUCOMTR-MCNC: 228 MG/DL — HIGH (ref 70–99)
GLUCOSE BLDC GLUCOMTR-MCNC: 230 MG/DL — HIGH (ref 70–99)
GLUCOSE BLDC GLUCOMTR-MCNC: 237 MG/DL — HIGH (ref 70–99)
GLUCOSE BLDC GLUCOMTR-MCNC: 237 MG/DL — HIGH (ref 70–99)
GLUCOSE BLDC GLUCOMTR-MCNC: 257 MG/DL — HIGH (ref 70–99)
GLUCOSE BLDC GLUCOMTR-MCNC: 260 MG/DL — HIGH (ref 70–99)
GLUCOSE BLDC GLUCOMTR-MCNC: 266 MG/DL — HIGH (ref 70–99)
GLUCOSE BLDC GLUCOMTR-MCNC: 269 MG/DL — HIGH (ref 70–99)
GLUCOSE BLDC GLUCOMTR-MCNC: 272 MG/DL — HIGH (ref 70–99)
GLUCOSE BLDC GLUCOMTR-MCNC: 294 MG/DL — HIGH (ref 70–99)
GLUCOSE BLDC GLUCOMTR-MCNC: 299 MG/DL — HIGH (ref 70–99)
GLUCOSE BLDC GLUCOMTR-MCNC: 313 MG/DL — HIGH (ref 70–99)
GLUCOSE BLDC GLUCOMTR-MCNC: 316 MG/DL — HIGH (ref 70–99)
GLUCOSE SERPL-MCNC: 275 MG/DL — HIGH (ref 70–99)
GLUCOSE SERPL-MCNC: 304 MG/DL — HIGH (ref 70–99)
GLUCOSE SERPL-MCNC: 396 MG/DL — HIGH (ref 70–99)
HCT VFR BLD CALC: 40.6 % — SIGNIFICANT CHANGE UP (ref 39–50)
HDLC SERPL-MCNC: 24 MG/DL — LOW
HGB BLD-MCNC: 14.3 G/DL — SIGNIFICANT CHANGE UP (ref 13–17)
LIPID PNL WITH DIRECT LDL SERPL: 142 MG/DL — HIGH
MAGNESIUM SERPL-MCNC: 2.2 MG/DL — SIGNIFICANT CHANGE UP (ref 1.6–2.6)
MAGNESIUM SERPL-MCNC: 2.4 MG/DL — SIGNIFICANT CHANGE UP (ref 1.6–2.6)
MCHC RBC-ENTMCNC: 34 PG — SIGNIFICANT CHANGE UP (ref 27–34)
MCHC RBC-ENTMCNC: 35.2 GM/DL — SIGNIFICANT CHANGE UP (ref 32–36)
MCV RBC AUTO: 96.4 FL — SIGNIFICANT CHANGE UP (ref 80–100)
NON HDL CHOLESTEROL: 204 MG/DL — HIGH
PHOSPHATE SERPL-MCNC: 3.5 MG/DL — SIGNIFICANT CHANGE UP (ref 2.4–4.7)
PHOSPHATE SERPL-MCNC: 4 MG/DL — SIGNIFICANT CHANGE UP (ref 2.4–4.7)
PLATELET # BLD AUTO: 203 K/UL — SIGNIFICANT CHANGE UP (ref 150–400)
POTASSIUM SERPL-MCNC: 3.7 MMOL/L — SIGNIFICANT CHANGE UP (ref 3.5–5.3)
POTASSIUM SERPL-MCNC: 3.8 MMOL/L — SIGNIFICANT CHANGE UP (ref 3.5–5.3)
POTASSIUM SERPL-MCNC: 4.3 MMOL/L — SIGNIFICANT CHANGE UP (ref 3.5–5.3)
POTASSIUM SERPL-SCNC: 3.7 MMOL/L — SIGNIFICANT CHANGE UP (ref 3.5–5.3)
POTASSIUM SERPL-SCNC: 3.8 MMOL/L — SIGNIFICANT CHANGE UP (ref 3.5–5.3)
POTASSIUM SERPL-SCNC: 4.3 MMOL/L — SIGNIFICANT CHANGE UP (ref 3.5–5.3)
RBC # BLD: 4.21 M/UL — SIGNIFICANT CHANGE UP (ref 4.2–5.8)
RBC # FLD: 11.7 % — SIGNIFICANT CHANGE UP (ref 10.3–14.5)
SARS-COV-2 IGG+IGM SERPL QL IA: 0.4 U/ML — SIGNIFICANT CHANGE UP
SARS-COV-2 IGG+IGM SERPL QL IA: NEGATIVE — SIGNIFICANT CHANGE UP
SODIUM SERPL-SCNC: 127 MMOL/L — LOW (ref 135–145)
SODIUM SERPL-SCNC: 131 MMOL/L — LOW (ref 135–145)
SODIUM SERPL-SCNC: 134 MMOL/L — LOW (ref 135–145)
TRIGL SERPL-MCNC: 308 MG/DL — HIGH
TROPONIN T SERPL-MCNC: <0.01 NG/ML — SIGNIFICANT CHANGE UP (ref 0–0.06)
WBC # BLD: 6.39 K/UL — SIGNIFICANT CHANGE UP (ref 3.8–10.5)
WBC # FLD AUTO: 6.39 K/UL — SIGNIFICANT CHANGE UP (ref 3.8–10.5)

## 2021-05-13 PROCEDURE — 99223 1ST HOSP IP/OBS HIGH 75: CPT

## 2021-05-13 PROCEDURE — 99233 SBSQ HOSP IP/OBS HIGH 50: CPT

## 2021-05-13 RX ORDER — SODIUM CHLORIDE 9 MG/ML
1000 INJECTION, SOLUTION INTRAVENOUS
Refills: 0 | Status: DISCONTINUED | OUTPATIENT
Start: 2021-05-13 | End: 2021-05-13

## 2021-05-13 RX ORDER — ATORVASTATIN CALCIUM 80 MG/1
40 TABLET, FILM COATED ORAL AT BEDTIME
Refills: 0 | Status: DISCONTINUED | OUTPATIENT
Start: 2021-05-13 | End: 2021-05-15

## 2021-05-13 RX ORDER — INSULIN GLARGINE 100 [IU]/ML
50 INJECTION, SOLUTION SUBCUTANEOUS EVERY MORNING
Refills: 0 | Status: DISCONTINUED | OUTPATIENT
Start: 2021-05-13 | End: 2021-05-14

## 2021-05-13 RX ORDER — INSULIN LISPRO 100/ML
VIAL (ML) SUBCUTANEOUS
Refills: 0 | Status: DISCONTINUED | OUTPATIENT
Start: 2021-05-13 | End: 2021-05-15

## 2021-05-13 RX ORDER — INSULIN LISPRO 100/ML
10 VIAL (ML) SUBCUTANEOUS
Refills: 0 | Status: DISCONTINUED | OUTPATIENT
Start: 2021-05-13 | End: 2021-05-14

## 2021-05-13 RX ADMIN — INSULIN HUMAN 6 UNIT(S)/HR: 100 INJECTION, SOLUTION SUBCUTANEOUS at 03:20

## 2021-05-13 RX ADMIN — ATORVASTATIN CALCIUM 40 MILLIGRAM(S): 80 TABLET, FILM COATED ORAL at 21:03

## 2021-05-13 RX ADMIN — Medication 10 UNIT(S): at 11:39

## 2021-05-13 RX ADMIN — SODIUM CHLORIDE 100 MILLILITER(S): 9 INJECTION, SOLUTION INTRAVENOUS at 03:20

## 2021-05-13 RX ADMIN — SODIUM CHLORIDE 100 MILLILITER(S): 9 INJECTION, SOLUTION INTRAVENOUS at 09:41

## 2021-05-13 RX ADMIN — ENOXAPARIN SODIUM 40 MILLIGRAM(S): 100 INJECTION SUBCUTANEOUS at 11:41

## 2021-05-13 RX ADMIN — CHLORHEXIDINE GLUCONATE 1 APPLICATION(S): 213 SOLUTION TOPICAL at 05:15

## 2021-05-13 RX ADMIN — INSULIN GLARGINE 50 UNIT(S): 100 INJECTION, SOLUTION SUBCUTANEOUS at 09:41

## 2021-05-13 RX ADMIN — Medication 6: at 11:39

## 2021-05-13 RX ADMIN — Medication 10 UNIT(S): at 16:54

## 2021-05-13 RX ADMIN — Medication 8: at 16:54

## 2021-05-13 NOTE — CONSULT NOTE ADULT - ASSESSMENT
41M w/ no PMHx presented at PMD with symptoms of polydipsia/polyuria/blurry vision x 1week and was sent in  for high blood sugar. In the ED, was found to have sNa 120, AG 24 with bicarb 19, and . Admitted for DKA.  Consult for new diabetes mgmt. A1c 12.5.    DKA- on insulin gtt, AG closed x1    likely Uncontrolled T2DM- hyperglycemic  -A1c 12.5  -check sugars AC and bedtime  -will need to be seen by cde for meter teach and insulin teach  -check cpeptide and CARMELA  -ensure diabetic diet  -can transition to basal bolus tonight after another gap is closed     start lantus 40 units QHS     start lispro 8 units TID once a diet is started     continue with insulin sliding scale  -should be seen by nutrition  -discussed diet with the patient and complications of diabetes    HLD- needs low fat diet. recommend starting low statin (lipitor 20mg daily)   41M w/ no PMHx presented at PMD with symptoms of polydipsia/polyuria/blurry vision x 1week and was sent in  for high blood sugar. In the ED, was found to have sNa 120, AG 24 with bicarb 19, and . Admitted for DKA.  Consult for new diabetes mgmt. A1c 12.5.    DKA- on insulin gtt, AG closed x1    likely Uncontrolled T2DM- hyperglycemic, might have flatbush diabetes   -A1c 12.5  -check sugars AC and bedtime  -will need to be seen by cde for meter teach and insulin teach  -check cpeptide and CARMELA  -ensure diabetic diet  -can transition to basal bolus tonight after another gap is closed     start lantus 40 units QHS     start lispro 8 units TID once a diet is started     continue with insulin sliding scale  -should be seen by nutrition  -discussed diet with the patient and complications of diabetes    HLD- needs low fat diet. recommend starting low statin (lipitor 20mg daily)

## 2021-05-13 NOTE — SBIRT NOTE ADULT - NSSBIRTALCPOSREINDET_GEN_A_CORE
Pt states he would only drink ETOH on the weekends, no more than 2-3 drinks. He would never drink more than 6 drinks. Pt states he has no issues w/ his ETOH use. Pt made aware of the physical impact w/ continued use, Pt states he would be able to stop on his own if needed.

## 2021-05-13 NOTE — ADVANCED PRACTICE NURSE CONSULT - ASSESSMENT
was referred to see pt by dr. mckenzie, pt is newly dx w dm, sp dka.  pt was educated about diabetes disease process and the importance of using insulin at this time. pt vebalizd understanding. also educated pt about the importance bg monitoring pt was advised to check bg 3-4xdaily  parameters provided,  pt was educated about ss of hypoglycemia and treatment and verbalized understanding. discussed healthy eating habits and encourged him not to miss meals. the plate method was used to teach portions. assisted pt to make healthy choices for meals and snacks. alternative sweetner substitute offered. pt has a fu appointment in dr kevin;s office on lalo 3 2021 @1400. written material about diabetes self management provided

## 2021-05-13 NOTE — CONSULT NOTE ADULT - SUBJECTIVE AND OBJECTIVE BOX
Patient is a 41y old  Male who presents with a chief complaint of DKA (13 May 2021 08:37)    HPI:  41M w/ no PMHx presented at PMD with symptoms of polydipsia/polyuria/blurry vision x 1week and was sent in  for high blood sugar. In the ED, was found to have sNa 120, AG 24 with bicarb 19, and . Admitted for DKA  Consult for new diabetes mgmt. A1c 12.5                PAST MEDICAL & SURGICAL HISTORY:  No pertinent past medical history    No significant past surgical history        Social History:  Quit smoking 2019, No Drugs or alcohol (12 May 2021 21:06)      FAMILY HISTORY:  see above      Allergies    No Known Allergies    Intolerances        REVIEW OF SYSTEMS:    CONSTITUTIONAL: No fever, weight loss, or fatigue  EYES: No eye pain, visual disturbances, or discharge  ENMT:  No difficulty hearing, tinnitus, vertigo; No sinus or throat pain  NECK: No pain or stiffness  RESPIRATORY: No cough, wheezing, chills or hemoptysis; No shortness of breath  CARDIOVASCULAR: No chest pain, palpitations, dizziness, or leg swelling  GASTROINTESTINAL: No abdominal or epigastric pain. No nausea, vomiting, or hematemesis; No diarrhea or constipation. No melena or hematochezia.  NEUROLOGICAL: No headaches, memory loss, loss of strength, numbness, or tremors  SKIN: No itching, burning, rashes, or lesions   MUSCULOSKELETAL: No joint pain or swelling; No muscle, back, or extremity pain  PSYCHIATRIC: No depression, anxiety, mood swings, or difficulty sleeping        MEDICATIONS  (STANDING):  chlorhexidine 2% Cloths 1 Application(s) Topical <User Schedule>  dextrose 5% + lactated ringers. 1000 milliLiter(s) (100 mL/Hr) IV Continuous <Continuous>  enoxaparin Injectable 40 milliGRAM(s) SubCutaneous daily  insulin glargine Injectable (LANTUS) 50 Unit(s) SubCutaneous every morning  insulin lispro (ADMELOG) corrective regimen sliding scale   SubCutaneous three times a day before meals  insulin lispro Injectable (ADMELOG) 10 Unit(s) SubCutaneous three times a day before meals  insulin regular Infusion 5 Unit(s)/Hr (5 mL/Hr) IV Continuous <Continuous>    MEDICATIONS  (PRN):      Vital Signs Last 24 Hrs  T(C): 36.8 (13 May 2021 08:00), Max: 36.9 (12 May 2021 22:15)  T(F): 98.2 (13 May 2021 08:00), Max: 98.4 (12 May 2021 22:15)  HR: 90 (13 May 2021 09:00) (83 - 115)  BP: 135/100 (13 May 2021 09:00) (121/73 - 144/99)  BP(mean): 110 (13 May 2021 09:00) (76 - 115)  RR: 20 (13 May 2021 09:00) (15 - 26)  SpO2: 99% (13 May 2021 09:00) (92% - 100%)    Physical Exam:    Constitutional: NAD, well-developed, obese  HEENT: EOMI, no exophalmos  Neck: trachea midline, no thyroid enlargement  Respiratory: CTAB, normal respirations  Cardiovascular: S1 and S2, RRR  Gastrointestinal: BS+, soft, ntnd  Extremities: No peripheral edema  Neurological: AOx3, no focal deficits  Psychiatric: Normal mood and normal affect  Skin: no rashes, no acanthosis    LABS  05-13    134<L>  |  93<L>  |  9.0  ----------------------------<  304<H>  3.8   |  27.0  |  0.88    Ca    9.9      13 May 2021 06:17  Phos  3.5     05-13  Mg     2.2     05-13    TPro  8.9<H>  /  Alb  5.0  /  TBili  0.8  /  DBili  x   /  AST  47<H>  /  ALT  54<H>  /  AlkPhos  66  05-12                          14.3   6.39  )-----------( 203      ( 13 May 2021 06:17 )             40.6       A1C with Estimated Average Glucose Result: 12.5 % (05-12-21 @ 19:08)        Ketone - Urine: Large (05-12 @ 19:13)    Alkaline Phosphatase, Serum: 66 U/L (05-12-21 @ 19:08)  Alanine Aminotransferase (ALT/SGPT): 54 U/L (05-12-21 @ 19:08)  Albumin, Serum: 5.0 g/dL (05-12-21 @ 19:08)  Aspartate Aminotransferase (AST/SGOT): 47 U/L (05-12-21 @ 19:08)      CAPILLARY BLOOD GLUCOSE      POCT Blood Glucose.: 230 mg/dL (13 May 2021 09:02)  POCT Blood Glucose.: 237 mg/dL (13 May 2021 08:08)  POCT Blood Glucose.: 237 mg/dL (13 May 2021 06:51)  POCT Blood Glucose.: 228 mg/dL (13 May 2021 06:03)  POCT Blood Glucose.: 269 mg/dL (13 May 2021 05:13)  POCT Blood Glucose.: 272 mg/dL (13 May 2021 04:07)  POCT Blood Glucose.: 316 mg/dL (13 May 2021 03:06)  POCT Blood Glucose.: 294 mg/dL (13 May 2021 02:12)  POCT Blood Glucose.: 266 mg/dL (13 May 2021 01:08)  POCT Blood Glucose.: 260 mg/dL (13 May 2021 00:09)  POCT Blood Glucose.: 282 mg/dL (12 May 2021 23:07)  POCT Blood Glucose.: 325 mg/dL (12 May 2021 22:04)  POCT Blood Glucose.: 360 mg/dL (12 May 2021 21:15)  POCT Blood Glucose.: 496 mg/dL (12 May 2021 20:08)  POCT Blood Glucose.: >530 mg/dL (12 May 2021 17:48)     Patient is a 41y old  Male who presents with a chief complaint of DKA (13 May 2021 08:37)    HPI:  41M w/ no PMHx presented at PMD with symptoms of polydipsia/polyuria/blurry vision x 1week and was sent in  for high blood sugar. In the ED, was found to have sNa 120, AG 24 with bicarb 19, and . Admitted for DKA  Consult for new diabetes mgmt. A1c 12.5    reports dietary indiscretion but had recently stopped drinking regular soda  reports that instead has been making juice with veggies and fruits  eats a lot of take out (pizza, pasta) when at work  lives with wife and mother  former smoker, EtOH on weekends (hard liquor)  fhx of diabetes: mother's entire side          PAST MEDICAL & SURGICAL HISTORY:  No pertinent past medical history    No significant past surgical history        Social History:  Quit smoking 2019, No Drugs or alcohol (12 May 2021 21:06)      FAMILY HISTORY:  see above      Allergies    No Known Allergies    Intolerances        REVIEW OF SYSTEMS:    CONSTITUTIONAL: No fever, weight loss, or fatigue  EYES: No eye pain, visual disturbances, or discharge  ENMT:  No difficulty hearing, tinnitus, vertigo; No sinus or throat pain  NECK: No pain or stiffness  RESPIRATORY: No cough, wheezing, chills or hemoptysis; No shortness of breath  CARDIOVASCULAR: No chest pain, palpitations, dizziness, or leg swelling  GASTROINTESTINAL: No abdominal or epigastric pain. No nausea, vomiting, or hematemesis; No diarrhea or constipation. No melena or hematochezia.  NEUROLOGICAL: No headaches, memory loss, loss of strength, numbness, or tremors  SKIN: No itching, burning, rashes, or lesions   MUSCULOSKELETAL: No joint pain or swelling; No muscle, back, or extremity pain  PSYCHIATRIC: No depression, anxiety, mood swings, or difficulty sleeping        MEDICATIONS  (STANDING):  chlorhexidine 2% Cloths 1 Application(s) Topical <User Schedule>  dextrose 5% + lactated ringers. 1000 milliLiter(s) (100 mL/Hr) IV Continuous <Continuous>  enoxaparin Injectable 40 milliGRAM(s) SubCutaneous daily  insulin glargine Injectable (LANTUS) 50 Unit(s) SubCutaneous every morning  insulin lispro (ADMELOG) corrective regimen sliding scale   SubCutaneous three times a day before meals  insulin lispro Injectable (ADMELOG) 10 Unit(s) SubCutaneous three times a day before meals  insulin regular Infusion 5 Unit(s)/Hr (5 mL/Hr) IV Continuous <Continuous>    MEDICATIONS  (PRN):      Vital Signs Last 24 Hrs  T(C): 36.8 (13 May 2021 08:00), Max: 36.9 (12 May 2021 22:15)  T(F): 98.2 (13 May 2021 08:00), Max: 98.4 (12 May 2021 22:15)  HR: 90 (13 May 2021 09:00) (83 - 115)  BP: 135/100 (13 May 2021 09:00) (121/73 - 144/99)  BP(mean): 110 (13 May 2021 09:00) (76 - 115)  RR: 20 (13 May 2021 09:00) (15 - 26)  SpO2: 99% (13 May 2021 09:00) (92% - 100%)    Physical Exam:    Constitutional: NAD, well-developed, obese  HEENT: EOMI, no exophalmos  Neck: trachea midline, no thyroid enlargement  Respiratory: CTAB, normal respirations  Cardiovascular: S1 and S2, RRR  Gastrointestinal: BS+, soft, ntnd  Extremities: No peripheral edema  Neurological: AOx3, no focal deficits  Psychiatric: Normal mood and normal affect  Skin: no rashes, no acanthosis    LABS  05-13    134<L>  |  93<L>  |  9.0  ----------------------------<  304<H>  3.8   |  27.0  |  0.88    Ca    9.9      13 May 2021 06:17  Phos  3.5     05-13  Mg     2.2     05-13    TPro  8.9<H>  /  Alb  5.0  /  TBili  0.8  /  DBili  x   /  AST  47<H>  /  ALT  54<H>  /  AlkPhos  66  05-12                          14.3   6.39  )-----------( 203      ( 13 May 2021 06:17 )             40.6       A1C with Estimated Average Glucose Result: 12.5 % (05-12-21 @ 19:08)        Ketone - Urine: Large (05-12 @ 19:13)    Alkaline Phosphatase, Serum: 66 U/L (05-12-21 @ 19:08)  Alanine Aminotransferase (ALT/SGPT): 54 U/L (05-12-21 @ 19:08)  Albumin, Serum: 5.0 g/dL (05-12-21 @ 19:08)  Aspartate Aminotransferase (AST/SGOT): 47 U/L (05-12-21 @ 19:08)      CAPILLARY BLOOD GLUCOSE      POCT Blood Glucose.: 230 mg/dL (13 May 2021 09:02)  POCT Blood Glucose.: 237 mg/dL (13 May 2021 08:08)  POCT Blood Glucose.: 237 mg/dL (13 May 2021 06:51)  POCT Blood Glucose.: 228 mg/dL (13 May 2021 06:03)  POCT Blood Glucose.: 269 mg/dL (13 May 2021 05:13)  POCT Blood Glucose.: 272 mg/dL (13 May 2021 04:07)  POCT Blood Glucose.: 316 mg/dL (13 May 2021 03:06)  POCT Blood Glucose.: 294 mg/dL (13 May 2021 02:12)  POCT Blood Glucose.: 266 mg/dL (13 May 2021 01:08)  POCT Blood Glucose.: 260 mg/dL (13 May 2021 00:09)  POCT Blood Glucose.: 282 mg/dL (12 May 2021 23:07)  POCT Blood Glucose.: 325 mg/dL (12 May 2021 22:04)  POCT Blood Glucose.: 360 mg/dL (12 May 2021 21:15)  POCT Blood Glucose.: 496 mg/dL (12 May 2021 20:08)  POCT Blood Glucose.: >530 mg/dL (12 May 2021 17:48)

## 2021-05-13 NOTE — PROGRESS NOTE ADULT - ASSESSMENT
Brief summary of hospital stay:  Mr. Christine is  41 y.o M with no reported pmh who presented to ED on 5/12 after PCP visit with hyperglycemia, polyuria polydipsia and blurry vision. In ED he was found to have FS 600s, gap 24, bicarb 19, ph 7.3, with ketones in urine and was admitted to ICU for mild DKA/HHS. He was started on IV insulin and close gap over night and transitioned to sq Insulin. Endocrinology team was consulted. A1C 12.5 this time. He was downgraded to hospitalist service on 5/13 in stable condition     # mild DKA/HHS in pt with new diagnosis of diabetis  - a1c 12.5  - Endocriniology consult noted  - c/w Glargin 50 units, AC 10 units, MDSS  - Diabetes specialist education  - c-peptide and CARMELA ordered    # HLD - started statins    # DVT ppx - Lovenox  # Code - full, wife updated at bed side   # Dispo - will need diabetic teaching and education before discharge.  Brief summary of hospital stay:  Mr. Christine is  41 y.o M with no reported pmh who presented to ED on 5/12 after PCP visit with hyperglycemia, polyuria polydipsia and blurry vision. In ED he was found to have FS 600s, gap 24, bicarb 19, ph 7.3, with ketones in urine and was admitted to ICU for mild DKA/HHS. He was started on IV insulin and close gap over night and transitioned to sq Insulin. Endocrinology team was consulted. A1C 12.5 this time. He was downgraded to hospitalist service on 5/13 in stable condition     # mild DKA/HHS in pt with new diagnosis of diabetis  - a1c 12.5  - Endocriniology consult noted  - c/w Glargin 50 units, AC 10 units, MDSS and will adjust insuline base on poc  - Diabetes specialist education  - c-peptide and CARMELA ordered    # HLD - started statins    # DVT ppx - Lovenox  # Code - full, wife updated at bed side   # Dispo -  still titrating insulin and will need diabetic teaching and education before discharge.

## 2021-05-13 NOTE — PROGRESS NOTE ADULT - ASSESSMENT
HPI/INTERVAL EVENTS: feels better today  noted to have snoring with apnea episodes overnight    EXAM:   GENERAL: NAD,   HEAD: Atraumatic, normocephalic,   EYES: EOMI, PERRL, sclera clear,   ENMT: MMM, No oropharyngeal erythema or exudate,   NECK: supple, trachea midline, no JVD noted,   NERVOUS SYSTEM: alert and oriented x 3, no motor deficits noted,   CHEST/LUNG: bilat air entry, no chest deformity, no crackles or wheeze,   HEART: regular rhythm, regular rate,   ABDOMEN: nontender, soft, nondistended, no rebound or guarding,   EXTREMITIES: no edema, no clubbing, no cyanosis,   LYMPH: no lymphadenopathy,    SKIN: good capillary refill,   PSYCH: appropriate affect and behavior,     RADIOLOGY REVIEWED:  cxr - no infiltrates     IMPRESSION/ASSESSMENT & PLAN:   40 yo previously healthy male presents with new onset DKA, now with closed anion gap, doing better.  No evidence of infection.  - will transition off insulin drip to lantus, overlap for 2 hours  - premeal and sliding scale insulin  - will need diabetes education   - will call endocrinology consult.   - dvt prophylaxix  - can downgrade from MICU later today if blood sugars remain stable off drip.  Patient likely also has MADI, will need outpatient PSG, informed patient of need for follow up.

## 2021-05-13 NOTE — ADVANCED PRACTICE NURSE CONSULT - RECOMMEDATIONS
continue diabetes self management education  insulin pen teaching  glucose meter teaching  pt to draw and inject all insulin doses while in the hospital using pre filled insulin syringe and rn supervision

## 2021-05-14 ENCOUNTER — TRANSCRIPTION ENCOUNTER (OUTPATIENT)
Age: 41
End: 2021-05-14

## 2021-05-14 LAB
C PEPTIDE SERPL-MCNC: 0.9 NG/ML — LOW (ref 1.1–4.4)
GLUCOSE BLDC GLUCOMTR-MCNC: 226 MG/DL — HIGH (ref 70–99)
GLUCOSE BLDC GLUCOMTR-MCNC: 265 MG/DL — HIGH (ref 70–99)
GLUCOSE BLDC GLUCOMTR-MCNC: 278 MG/DL — HIGH (ref 70–99)
GLUCOSE BLDC GLUCOMTR-MCNC: 306 MG/DL — HIGH (ref 70–99)

## 2021-05-14 PROCEDURE — 99233 SBSQ HOSP IP/OBS HIGH 50: CPT

## 2021-05-14 PROCEDURE — 99232 SBSQ HOSP IP/OBS MODERATE 35: CPT

## 2021-05-14 RX ORDER — INSULIN LISPRO 100/ML
15 VIAL (ML) SUBCUTANEOUS
Refills: 0 | Status: DISCONTINUED | OUTPATIENT
Start: 2021-05-14 | End: 2021-05-15

## 2021-05-14 RX ORDER — INSULIN GLARGINE 100 [IU]/ML
50 INJECTION, SOLUTION SUBCUTANEOUS EVERY MORNING
Refills: 0 | Status: DISCONTINUED | OUTPATIENT
Start: 2021-05-14 | End: 2021-05-15

## 2021-05-14 RX ORDER — INSULIN LISPRO 100/ML
12 VIAL (ML) SUBCUTANEOUS
Refills: 0 | Status: DISCONTINUED | OUTPATIENT
Start: 2021-05-14 | End: 2021-05-14

## 2021-05-14 RX ORDER — INSULIN GLARGINE 100 [IU]/ML
55 INJECTION, SOLUTION SUBCUTANEOUS EVERY MORNING
Refills: 0 | Status: DISCONTINUED | OUTPATIENT
Start: 2021-05-14 | End: 2021-05-14

## 2021-05-14 RX ORDER — INSULIN LISPRO 100/ML
10 VIAL (ML) SUBCUTANEOUS
Refills: 0 | Status: DISCONTINUED | OUTPATIENT
Start: 2021-05-14 | End: 2021-05-14

## 2021-05-14 RX ADMIN — Medication 8: at 13:07

## 2021-05-14 RX ADMIN — Medication 15 UNIT(S): at 18:02

## 2021-05-14 RX ADMIN — Medication 10 UNIT(S): at 09:07

## 2021-05-14 RX ADMIN — Medication 6: at 09:07

## 2021-05-14 RX ADMIN — CHLORHEXIDINE GLUCONATE 1 APPLICATION(S): 213 SOLUTION TOPICAL at 05:52

## 2021-05-14 RX ADMIN — INSULIN GLARGINE 50 UNIT(S): 100 INJECTION, SOLUTION SUBCUTANEOUS at 09:08

## 2021-05-14 RX ADMIN — Medication 6: at 18:02

## 2021-05-14 RX ADMIN — ENOXAPARIN SODIUM 40 MILLIGRAM(S): 100 INJECTION SUBCUTANEOUS at 12:24

## 2021-05-14 RX ADMIN — ATORVASTATIN CALCIUM 40 MILLIGRAM(S): 80 TABLET, FILM COATED ORAL at 21:45

## 2021-05-14 NOTE — PROGRESS NOTE ADULT - ASSESSMENT
41M w/ no PMHx presented at PMD with symptoms of polydipsia/polyuria/blurry vision x 1week and was sent in  for high blood sugar. In the ED, was found to have sNa 120, AG 24 with bicarb 19, and . Admitted for DKA.  Consult for new diabetes mgmt. A1c 12.5.    DKA- on insulin gtt, AG closed x1    likely Uncontrolled T2DM- hyperglycemic, might have flatbush diabetes   -A1c 12.5  -check sugars AC and bedtime  -will need to be seen by cde for meter teach and insulin teach  -check cpeptide and CARMELA  -ensure diabetic diet  -continue lantus 50 units QHS  -increase to lispro 15 units TID once a diet is started  -depending cpeptide, might be able to add on metformin 500mg BID  -continue with insulin sliding scale  -should be seen by nutrition  -discussed diet with the patient and complications of diabetes    HLD- continue lipitor 40

## 2021-05-14 NOTE — DISCHARGE NOTE NURSING/CASE MANAGEMENT/SOCIAL WORK - PATIENT PORTAL LINK FT
You can access the FollowMyHealth Patient Portal offered by [facility] by registering at the following website: [email or web address]. By joining [employer]’s FollowMyHealth portal, you will also be able to view your health information using other applications (apps) compatible with our system.

## 2021-05-14 NOTE — PROGRESS NOTE ADULT - ASSESSMENT
42 yo M with no reported pmh who presented to ED on 5/12 after PCP visit with hyperglycemia, polyuria polydipsia and blurry vision. In ED he was found to have FS 600s, gap 24, bicarb 19, ph 7.3, with ketones in urine and was admitted to ICU for mild DKA/HHS. He was started on IV insulin and close gap over night and transitioned to sq Insulin. Endocrinology team was consulted. A1C 12.5 this time. He was downgraded to hospitalist service on 5/13 in stable condition     #Type 2 Diabetes- uncontrolled (new onset)  - s/p DKA and ICU  - a1c 12.5 on admit   - Endocrinology consult   - lantus and premeal  - iss  - monitor fingersticks    # HLD   - statin    #DVT Prophylaxis  - lovenox SC

## 2021-05-14 NOTE — DISCHARGE NOTE NURSING/CASE MANAGEMENT/SOCIAL WORK - NSDCFUADDAPPT_GEN_ALL_CORE_FT
You have a follow up appointment in Dr. Acosta’s office on Nena 3, 2021 at 2:00pm. Address: [address] [place] [address]. Phone: [phone]. Please make every effort to make this appointment. If you are unable to keep this appointment for any reason, please call to reschedule.

## 2021-05-15 ENCOUNTER — TRANSCRIPTION ENCOUNTER (OUTPATIENT)
Age: 41
End: 2021-05-15

## 2021-05-15 VITALS
DIASTOLIC BLOOD PRESSURE: 86 MMHG | TEMPERATURE: 98 F | RESPIRATION RATE: 18 BRPM | OXYGEN SATURATION: 95 % | HEART RATE: 102 BPM | SYSTOLIC BLOOD PRESSURE: 126 MMHG

## 2021-05-15 LAB
GLUCOSE BLDC GLUCOMTR-MCNC: 239 MG/DL — HIGH (ref 70–99)
GLUCOSE BLDC GLUCOMTR-MCNC: 255 MG/DL — HIGH (ref 70–99)

## 2021-05-15 PROCEDURE — U0005: CPT

## 2021-05-15 PROCEDURE — 99239 HOSP IP/OBS DSCHRG MGMT >30: CPT

## 2021-05-15 PROCEDURE — 82009 KETONE BODYS QUAL: CPT

## 2021-05-15 PROCEDURE — 71045 X-RAY EXAM CHEST 1 VIEW: CPT

## 2021-05-15 PROCEDURE — 84132 ASSAY OF SERUM POTASSIUM: CPT

## 2021-05-15 PROCEDURE — 81001 URINALYSIS AUTO W/SCOPE: CPT

## 2021-05-15 PROCEDURE — 83605 ASSAY OF LACTIC ACID: CPT

## 2021-05-15 PROCEDURE — 85027 COMPLETE CBC AUTOMATED: CPT

## 2021-05-15 PROCEDURE — 84484 ASSAY OF TROPONIN QUANT: CPT

## 2021-05-15 PROCEDURE — 83735 ASSAY OF MAGNESIUM: CPT

## 2021-05-15 PROCEDURE — 83690 ASSAY OF LIPASE: CPT

## 2021-05-15 PROCEDURE — 82435 ASSAY OF BLOOD CHLORIDE: CPT

## 2021-05-15 PROCEDURE — 82947 ASSAY GLUCOSE BLOOD QUANT: CPT

## 2021-05-15 PROCEDURE — 36415 COLL VENOUS BLD VENIPUNCTURE: CPT

## 2021-05-15 PROCEDURE — U0003: CPT

## 2021-05-15 PROCEDURE — 82330 ASSAY OF CALCIUM: CPT

## 2021-05-15 PROCEDURE — 84681 ASSAY OF C-PEPTIDE: CPT

## 2021-05-15 PROCEDURE — 86341 ISLET CELL ANTIBODY: CPT

## 2021-05-15 PROCEDURE — 99285 EMERGENCY DEPT VISIT HI MDM: CPT | Mod: 25

## 2021-05-15 PROCEDURE — 84100 ASSAY OF PHOSPHORUS: CPT

## 2021-05-15 PROCEDURE — 96360 HYDRATION IV INFUSION INIT: CPT

## 2021-05-15 PROCEDURE — 85025 COMPLETE CBC W/AUTO DIFF WBC: CPT

## 2021-05-15 PROCEDURE — 82803 BLOOD GASES ANY COMBINATION: CPT

## 2021-05-15 PROCEDURE — 83036 HEMOGLOBIN GLYCOSYLATED A1C: CPT

## 2021-05-15 PROCEDURE — 80061 LIPID PANEL: CPT

## 2021-05-15 PROCEDURE — 82962 GLUCOSE BLOOD TEST: CPT

## 2021-05-15 PROCEDURE — 93005 ELECTROCARDIOGRAM TRACING: CPT

## 2021-05-15 PROCEDURE — 80053 COMPREHEN METABOLIC PANEL: CPT

## 2021-05-15 PROCEDURE — 86769 SARS-COV-2 COVID-19 ANTIBODY: CPT

## 2021-05-15 PROCEDURE — 80048 BASIC METABOLIC PNL TOTAL CA: CPT

## 2021-05-15 PROCEDURE — 84295 ASSAY OF SERUM SODIUM: CPT

## 2021-05-15 PROCEDURE — 85018 HEMOGLOBIN: CPT

## 2021-05-15 PROCEDURE — 85014 HEMATOCRIT: CPT

## 2021-05-15 PROCEDURE — 82550 ASSAY OF CK (CPK): CPT

## 2021-05-15 RX ORDER — ATORVASTATIN CALCIUM 80 MG/1
1 TABLET, FILM COATED ORAL
Qty: 30 | Refills: 0
Start: 2021-05-15 | End: 2021-06-13

## 2021-05-15 RX ORDER — INSULIN LISPRO 100/ML
15 VIAL (ML) SUBCUTANEOUS
Qty: 5 | Refills: 0
Start: 2021-05-15 | End: 2021-06-13

## 2021-05-15 RX ORDER — ISOPROPYL ALCOHOL, BENZOCAINE .7; .06 ML/ML; ML/ML
1 SWAB TOPICAL
Qty: 100 | Refills: 1
Start: 2021-05-15 | End: 2021-07-03

## 2021-05-15 RX ORDER — ENOXAPARIN SODIUM 100 MG/ML
50 INJECTION SUBCUTANEOUS
Qty: 5 | Refills: 0
Start: 2021-05-15 | End: 2021-06-13

## 2021-05-15 RX ADMIN — Medication 15 UNIT(S): at 12:17

## 2021-05-15 RX ADMIN — Medication 6: at 08:43

## 2021-05-15 RX ADMIN — ENOXAPARIN SODIUM 40 MILLIGRAM(S): 100 INJECTION SUBCUTANEOUS at 12:23

## 2021-05-15 RX ADMIN — Medication 4: at 12:16

## 2021-05-15 RX ADMIN — CHLORHEXIDINE GLUCONATE 1 APPLICATION(S): 213 SOLUTION TOPICAL at 05:51

## 2021-05-15 RX ADMIN — INSULIN GLARGINE 50 UNIT(S): 100 INJECTION, SOLUTION SUBCUTANEOUS at 09:40

## 2021-05-15 RX ADMIN — Medication 15 UNIT(S): at 08:43

## 2021-05-15 NOTE — DISCHARGE NOTE PROVIDER - NSDCFUADDAPPT_GEN_ALL_CORE_FT
You have a follow up appointment in Dr. Acosta’s office on Nena 3, 2021 at 2:00pm. Address: [address] [place] [address]. Phone: [phone]. Please make every effort to make this appointment. If you are unable to keep this appointment for any reason, please call to reschedule.

## 2021-05-15 NOTE — DISCHARGE NOTE PROVIDER - CARE PROVIDERS DIRECT ADDRESSES
[email or web address]

## 2021-05-15 NOTE — PROGRESS NOTE ADULT - ASSESSMENT
40 yo M with no reported pmh who presented to ED on 5/12 after PCP visit with hyperglycemia, polyuria polydipsia and blurry vision. In ED he was found to have FS 600s, gap 24, bicarb 19, ph 7.3, with ketones in urine and was admitted to ICU for mild DKA/HHS. He was started on IV insulin and closed gap transitioned to sq Insulin. Endocrinology team was consulted. A1C 12.5 this time. He was downgraded to hospitalist service on 5/13 in stable condition. d/w endo patient with decreased cpeptide will d/c patient home today on insulin w/ supplies.      #Type 2 Diabetes- uncontrolled (new onset)  - s/p DKA and ICU  - a1c 12.5 on admit   - Endocrinology consult   - lantus and premeal  - iss  - monitor fingersticks    # HLD   - statin    #DVT Prophylaxis  - lovenox SC

## 2021-05-15 NOTE — DISCHARGE NOTE PROVIDER - HOSPITAL COURSE
42 yo M with no reported pmh who presented to ED on 5/12 after PCP visit with hyperglycemia, polyuria polydipsia and blurry vision. In ED he was found to have FS 600s, gap 24, bicarb 19, ph 7.3, with ketones in urine and was admitted to ICU for mild DKA/HHS. He was started on IV insulin and closed gap transitioned to sq Insulin. Endocrinology team was consulted. A1C 12.5 this time. He was downgraded to hospitalist service on 5/13 in stable condition. d/w endo patient with decreased cpeptide will d/c patient home today on insulin w/ supplies.      Time spent on patients discharge 32 minutes

## 2021-05-15 NOTE — DISCHARGE NOTE PROVIDER - PROVIDER TOKENS
PROVIDER:[TOKEN:[[id number]]],FREE:[LAST:[Primary Care Doctor],PHONE:[(   )    -],FAX:[(   )    -]]

## 2021-05-15 NOTE — PROGRESS NOTE ADULT - SUBJECTIVE AND OBJECTIVE BOX
[facility] Division of Hospital Medicine    Chief Complaint:  DKA    SUBJECTIVE: reports feeling well, no specific complains at the moment.     OVERNIGHT EVENTS: none reported;     Patient denies chest pain, SOB, abd pain, N/V, fever, chills, dysuria or any other complaints. All remainder ROS negative.     MEDICATIONS  (STANDING):  atorvastatin 40 milliGRAM(s) Oral at bedtime  chlorhexidine 2% Cloths 1 Application(s) Topical <User Schedule>  dextrose 5% + lactated ringers. 1000 milliLiter(s) (100 mL/Hr) IV Continuous <Continuous>  enoxaparin Injectable 40 milliGRAM(s) SubCutaneous daily  insulin glargine Injectable (LANTUS) 50 Unit(s) SubCutaneous every morning  insulin lispro (ADMELOG) corrective regimen sliding scale   SubCutaneous three times a day before meals  insulin lispro Injectable (ADMELOG) 10 Unit(s) SubCutaneous three times a day before meals  insulin regular Infusion 5 Unit(s)/Hr (5 mL/Hr) IV Continuous <Continuous>    MEDICATIONS  (PRN):        I&O's Summary    12 May 2021 07:01  -  13 May 2021 07:00  --------------------------------------------------------  IN: 1511 mL / OUT: 600 mL / NET: 911 mL    13 May 2021 07:01  -  13 May 2021 17:35  --------------------------------------------------------  IN: 420 mL / OUT: 950 mL / NET: -530 mL        PHYSICAL EXAM:  Vital Signs Last 24 Hrs  T(C): 36.7 (13 May 2021 17:11), Max: 36.9 (12 May 2021 22:15)  T(F): 98 (13 May 2021 17:11), Max: 98.4 (12 May 2021 22:15)  HR: 94 (13 May 2021 17:11) (83 - 115)  BP: 124/86 (13 May 2021 17:11) (121/73 - 144/99)  BP(mean): 105 (13 May 2021 13:00) (76 - 115)  RR: 18 (13 May 2021 17:11) (15 - 26)  SpO2: 96% (13 May 2021 17:11) (92% - 100%)        CONSTITUTIONAL: NAD, well-developed, well-groomed, obese  ENMT: Moist oral mucosa, no pharyngeal injection or exudates; normal dentition; No JVD  RESPIRATORY: Normal respiratory effort; lungs are clear to auscultation bilaterally  CARDIOVASCULAR: Regular rate and rhythm, normal S1 and S2, no murmur/rub/gallop; No lower extremity edema; Peripheral pulses are 2+ bilaterally  ABDOMEN: Nontender to palpation, normoactive bowel sounds, no rebound/guarding; No hepatosplenomegaly  MUSCLOSKELETAL:  no clubbing or cyanosis of digits; no joint swelling or tenderness to palpation  PSYCH: A+O to person, place, and time; affect appropriate  NEUROLOGY: CN 2-12 are intact and symmetric; no gross sensory deficits; was observed moving all 4 ext against gravity cooperating with exam.   SKIN: No rashes; no palpable lesions    LABS:                        14.3   6.39  )-----------( 203      ( 13 May 2021 06:17 )             40.6     05-13    131<L>  |  93<L>  |  8.0  ----------------------------<  275<H>  3.7   |  26.0  |  0.78    Ca    9.7      13 May 2021 11:07  Phos  3.5     05-13  Mg     2.2     05-13    TPro  8.9<H>  /  Alb  5.0  /  TBili  0.8  /  DBili  x   /  AST  47<H>  /  ALT  54<H>  /  AlkPhos  66  05-12      CARDIAC MARKERS ( 12 May 2021 23:43 )  x     / <0.01 ng/mL / 108 U/L / x     / x          Urinalysis Basic - ( 12 May 2021 19:13 )    Color: Yellow / Appearance: Clear / S.010 / pH: x  Gluc: x / Ketone: Large  / Bili: Negative / Urobili: Negative mg/dL   Blood: x / Protein: 15 mg/dL / Nitrite: Negative   Leuk Esterase: Negative / RBC: 0-2 /HPF / WBC 0-2   Sq Epi: x / Non Sq Epi: Occasional / Bacteria: Occasional        CAPILLARY BLOOD GLUCOSE      POCT Blood Glucose.: 313 mg/dL (13 May 2021 16:51)  POCT Blood Glucose.: 257 mg/dL (13 May 2021 11:35)  POCT Blood Glucose.: 230 mg/dL (13 May 2021 09:02)  POCT Blood Glucose.: 237 mg/dL (13 May 2021 08:08)  POCT Blood Glucose.: 237 mg/dL (13 May 2021 06:51)  POCT Blood Glucose.: 228 mg/dL (13 May 2021 06:03)  POCT Blood Glucose.: 269 mg/dL (13 May 2021 05:13)  POCT Blood Glucose.: 272 mg/dL (13 May 2021 04:07)  POCT Blood Glucose.: 316 mg/dL (13 May 2021 03:06)  POCT Blood Glucose.: 294 mg/dL (13 May 2021 02:12)  POCT Blood Glucose.: 266 mg/dL (13 May 2021 01:08)  POCT Blood Glucose.: 260 mg/dL (13 May 2021 00:09)  POCT Blood Glucose.: 282 mg/dL (12 May 2021 23:07)  POCT Blood Glucose.: 325 mg/dL (12 May 2021 22:04)  POCT Blood Glucose.: 360 mg/dL (12 May 2021 21:15)  POCT Blood Glucose.: 496 mg/dL (12 May 2021 20:08)  POCT Blood Glucose.: >530 mg/dL (12 May 2021 17:48)        RADIOLOGY & ADDITIONAL TESTS:  Results Reviewed:   Imaging Personally Reviewed:  Electrocardiogram Personally Reviewed:
Patient is a 41y old  Male who presents with a chief complaint of DKA (14 May 2021 15:09)      Patient seen and examined at bedside.      ALLERGIES:  No Known Allergies    MEDICATIONS  (STANDING):  atorvastatin 40 milliGRAM(s) Oral at bedtime  chlorhexidine 2% Cloths 1 Application(s) Topical <User Schedule>  enoxaparin Injectable 40 milliGRAM(s) SubCutaneous daily  insulin glargine Injectable (LANTUS) 50 Unit(s) SubCutaneous every morning  insulin lispro (ADMELOG) corrective regimen sliding scale   SubCutaneous three times a day before meals  insulin lispro Injectable (ADMELOG) 15 Unit(s) SubCutaneous three times a day before meals    MEDICATIONS  (PRN):    Vital Signs Last 24 Hrs  T(F): 98.4 (15 May 2021 00:00), Max: 98.9 (14 May 2021 19:25)  HR: 96 (15 May 2021 00:00) (96 - 111)  BP: 121/83 (15 May 2021 00:00) (121/83 - 145/79)  RR: 18 (15 May 2021 00:00) (18 - 18)  SpO2: 94% (15 May 2021 00:00) (94% - 96%)  I&O's Summary    PHYSICAL EXAM:  General: NAD, Alert  ENT: MMM, no thrush  Neck: Supple, No JVD  Lungs: good air entry, non-labored breathing  Cardio: +s1/s2  Abdomen: Soft, Nontender, Nondistended; Bowel sounds present  Extremities: No calf tenderness      LABS:                        14.3   6.39  )-----------( 203      ( 13 May 2021 06:17 )             40.6         131  |  93  |  8.0  ----------------------------<  275  3.7   |  26.0  |  0.78    Ca    9.7      13 May 2021 11:07  Phos  3.5       Mg     2.2         TPro  8.9  /  Alb  5.0  /  TBili  0.8  /  DBili  x   /  AST  47  /  ALT  54  /  AlkPhos  66        Lipase, Serum: 51 U/L (- @ 19:09)    eGFR if Non African American: 113 mL/min/1.73M2 (21 @ 11:07)  eGFR if : 131 mL/min/1.73M2 (21 @ 11:07)    CARDIAC MARKERS ( 12 May 2021 23:43 )  x     / <0.01 ng/mL / 108 U/L / x     / x         Chol 228 mg/dL LDL -- HDL 24 mg/dL Trig 308 mg/dL    19:05 - VBG - pH: 7.30  | pCO2: 46    | pO2: 62    | Lactate: 2.1      Glucose  POCT Blood Glucose.: 255 mg/dL (15 May 2021 08:29)  POCT Blood Glucose.: 226 mg/dL (14 May 2021 21:45)  POCT Blood Glucose.: 265 mg/dL (14 May 2021 17:04)  POCT Blood Glucose.: 306 mg/dL (14 May 2021 12:06)    Urinalysis Basic - ( 12 May 2021 19:13 )  Color: Yellow / Appearance: Clear / S.010 / pH: x  Gluc: x / Ketone: Large  / Bili: Negative / Urobili: Negative mg/dL   Blood: x / Protein: 15 mg/dL / Nitrite: Negative   Leuk Esterase: Negative / RBC: 0-2 /HPF / WBC 0-2   Sq Epi: x / Non Sq Epi: Occasional / Bacteria: Occasional    RADIOLOGY & ADDITIONAL TESTS:  < from: Xray Chest 1 View- PORTABLE-Urgent (Xray Chest 1 View- PORTABLE-Urgent .) (21 @ 18:35) >  IMPRESSION: Multiple old left rib fractures. No acute finding.  < end of copied text >    Care Discussed with Consultants/Other Providers:   Endocrinology
Patient is a 41y old  Male who presents with a chief complaint of DKA (13 May 2021 17:35)    Patient seen and examined at bedside.      ALLERGIES:  No Known Allergies    MEDICATIONS  (STANDING):  atorvastatin 40 milliGRAM(s) Oral at bedtime  chlorhexidine 2% Cloths 1 Application(s) Topical <User Schedule>  enoxaparin Injectable 40 milliGRAM(s) SubCutaneous daily  insulin glargine Injectable (LANTUS) 50 Unit(s) SubCutaneous every morning  insulin lispro (ADMELOG) corrective regimen sliding scale   SubCutaneous three times a day before meals  insulin lispro Injectable (ADMELOG) 10 Unit(s) SubCutaneous three times a day before meals    MEDICATIONS  (PRN):    Vital Signs Last 24 Hrs  T(F): 98.5 (14 May 2021 10:20), Max: 98.5 (14 May 2021 10:20)  HR: 100 (14 May 2021 10:20) (90 - 100)  BP: 125/88 (14 May 2021 10:20) (121/87 - 141/88)  RR: 18 (14 May 2021 10:20) (15 - 18)  SpO2: 98% (14 May 2021 10:20) (96% - 100%)  I&O's Summary    13 May 2021 07:01  -  14 May 2021 07:00  --------------------------------------------------------  IN: 420 mL / OUT: 950 mL / NET: -530 mL    PHYSICAL EXAM:  General: NAD, Alert  ENT: MMM, no thrush  Neck: Supple, No JVD  Lungs: good air entry, non-labored breathing  Cardio: +s1/s2  Abdomen: Soft, Nontender, Nondistended; Bowel sounds present  Extremities: No calf tenderness    LABS:                        14.3   6.39  )-----------( 203      ( 13 May 2021 06:17 )             40.6     05-    131  |  93  |  8.0  ----------------------------<  275  3.7   |  26.0  |  0.78    Ca    9.7      13 May 2021 11:07  Phos  3.5       Mg     2.2         TPro  8.9  /  Alb  5.0  /  TBili  0.8  /  DBili  x   /  AST  47  /  ALT  54  /  AlkPhos  66      Lipase, Serum: 51 U/L (21 @ 19:09)    eGFR if Non African American: 113 mL/min/1.73M2 (21 @ 11:07)  eGFR if : 131 mL/min/1.73M2 (21 @ 11:07)    CARDIAC MARKERS ( 12 May 2021 23:43 )  x     / <0.01 ng/mL / 108 U/L / x     / x         Chol 228 mg/dL LDL -- HDL 24 mg/dL Trig 308 mg/dL    19:05 - VBG - pH: 7.30  | pCO2: 46    | pO2: 62    | Lactate: 2.1      Glucose  POCT Blood Glucose.: 306 mg/dL (14 May 2021 12:06)  POCT Blood Glucose.: 278 mg/dL (14 May 2021 08:33)  POCT Blood Glucose.: 299 mg/dL (13 May 2021 20:54)  POCT Blood Glucose.: 313 mg/dL (13 May 2021 16:51)    Urinalysis Basic - ( 12 May 2021 19:13 )  Color: Yellow / Appearance: Clear / S.010 / pH: x  Gluc: x / Ketone: Large  / Bili: Negative / Urobili: Negative mg/dL   Blood: x / Protein: 15 mg/dL / Nitrite: Negative   Leuk Esterase: Negative / RBC: 0-2 /HPF / WBC 0-2   Sq Epi: x / Non Sq Epi: Occasional / Bacteria: Occasional    RADIOLOGY & ADDITIONAL TESTS:  < from: Xray Chest 1 View- PORTABLE-Urgent (Xray Chest 1 View- PORTABLE-Urgent .) (21 @ 18:35) >  IMPRESSION: Multiple old left rib fractures. No acute finding.  < end of copied text >    Care Discussed with Consultants/Other Providers:   Endocrinology
Interval Events:  no overnight events  follow up on diabetes    patient seen and examined at bedside.  mom at bedside  FS still high      REVIEW OF SYSTEMS:    CONSTITUTIONAL: No fever, weight loss, or fatigue  EYES: No eye pain, visual disturbances, or discharge  ENMT:  No difficulty hearing, tinnitus, vertigo; No sinus or throat pain  NECK: No pain or stiffness  RESPIRATORY: No cough, wheezing, chills or hemoptysis; No shortness of breath  CARDIOVASCULAR: No chest pain, palpitations, dizziness, or leg swelling  GASTROINTESTINAL: No abdominal or epigastric pain. No nausea, vomiting, or hematemesis; No diarrhea or constipation. No melena or hematochezia.  NEUROLOGICAL: No headaches, memory loss, loss of strength, numbness, or tremors  SKIN: No itching, burning, rashes, or lesions   MUSCULOSKELETAL: No joint pain or swelling; No muscle, back, or extremity pain  PSYCHIATRIC: No depression, anxiety, mood swings, or difficulty sleeping        No Known Allergies      MEDICATIONS  (STANDING):  atorvastatin 40 milliGRAM(s) Oral at bedtime  chlorhexidine 2% Cloths 1 Application(s) Topical <User Schedule>  enoxaparin Injectable 40 milliGRAM(s) SubCutaneous daily  insulin glargine Injectable (LANTUS) 50 Unit(s) SubCutaneous every morning  insulin lispro (ADMELOG) corrective regimen sliding scale   SubCutaneous three times a day before meals  insulin lispro Injectable (ADMELOG) 15 Unit(s) SubCutaneous three times a day before meals    MEDICATIONS  (PRN):      Vital Signs Last 24 Hrs  T(C): 36.9 (14 May 2021 10:20), Max: 36.9 (14 May 2021 10:20)  T(F): 98.5 (14 May 2021 10:20), Max: 98.5 (14 May 2021 10:20)  HR: 100 (14 May 2021 10:20) (90 - 100)  BP: 125/88 (14 May 2021 10:20) (121/87 - 132/78)  BP(mean): --  RR: 18 (14 May 2021 10:20) (18 - 18)  SpO2: 98% (14 May 2021 10:20) (96% - 98%)    Physical Exam:    Constitutional: NAD, well-developed  HEENT: EOMI, no exophalmos  Neck: trachea midline, no thyroid enlargement  Respiratory: CTAB, normal respirations  Cardiovascular: S1 and S2, RRR  Gastrointestinal: BS+, soft, ntnd  Extremities: No peripheral edema  Neurological: AOx3, no focal deficits  Psychiatric: Normal mood and normal affect  Skin: no rashes, no acanthosis    LABS  05-13    131<L>  |  93<L>  |  8.0  ----------------------------<  275<H>  3.7   |  26.0  |  0.78    Ca    9.7      13 May 2021 11:07  Phos  3.5     05-13  Mg     2.2     05-13    TPro  8.9<H>  /  Alb  5.0  /  TBili  0.8  /  DBili  x   /  AST  47<H>  /  ALT  54<H>  /  AlkPhos  66  05-12                          14.3   6.39  )-----------( 203      ( 13 May 2021 06:17 )             40.6     HDL Cholesterol, Serum: 24 mg/dL (05-13-21 @ 06:17)  Triglycerides, Serum: 308 mg/dL (05-13-21 @ 06:17)  Cholesterol, Serum: 228 mg/dL (05-13-21 @ 06:17)    A1C with Estimated Average Glucose Result: 12.5 % (05-12-21 @ 19:08)    Alanine Aminotransferase (ALT/SGPT): 54 U/L (05-12-21 @ 19:08)  Alkaline Phosphatase, Serum: 66 U/L (05-12-21 @ 19:08)  Albumin, Serum: 5.0 g/dL (05-12-21 @ 19:08)  Aspartate Aminotransferase (AST/SGOT): 47 U/L (05-12-21 @ 19:08)          CAPILLARY BLOOD GLUCOSE      POCT Blood Glucose.: 306 mg/dL (14 May 2021 12:06)  POCT Blood Glucose.: 278 mg/dL (14 May 2021 08:33)  POCT Blood Glucose.: 299 mg/dL (13 May 2021 20:54)  POCT Blood Glucose.: 313 mg/dL (13 May 2021 16:51)  
On Admission  21 (1d)  HPI:  41 year old male Denies any PMHx ( including DM, HTN, HLD).  Presents to ED after being seen by PMD and was found to have High Blood Sugar.  Patient states had not been feeling well with Polydipsia Polyu may and blurry vision for past week.  Patient denies any other associated symptoms and denies any SOB, CP, Fevers, Chills, UTI or URI symptoms.  Denies any Hematemesis hematochezia ort diarrhea.  IN ED found to have an ANion Gap and Blood Glucose > 600. (12 May 2021 21:06)    PAST MEDICAL & SURGICAL HISTORY:  No pertinent past medical history    No significant past surgical history        Antimicrobial:    Cardiovascular:    Pulmonary:    Hematalogic:  enoxaparin Injectable 40 milliGRAM(s) SubCutaneous daily    Other:  chlorhexidine 2% Cloths 1 Application(s) Topical <User Schedule>  dextrose 5% + lactated ringers. 1000 milliLiter(s) IV Continuous <Continuous>  insulin glargine Injectable (LANTUS) 50 Unit(s) SubCutaneous every morning  insulin lispro (ADMELOG) corrective regimen sliding scale   SubCutaneous three times a day before meals  insulin lispro Injectable (ADMELOG) 10 Unit(s) SubCutaneous three times a day before meals  insulin regular Infusion 5 Unit(s)/Hr IV Continuous <Continuous>      Drug Dosing Weight  Height (cm): 175.3 (12 May 2021 17:42)  Weight (kg): 114.8 (12 May 2021 17:42)  BMI (kg/m2): 37.4 (12 May 2021 17:42)  BSA (m2): 2.28 (12 May 2021 17:42)    T(C): 36.9 (21 @ 04:00), Max: 36.9 (21 @ 22:15)  HR: 87 (21 @ 08:00)  BP: 123/64 (21 @ 08:00)  BP(mean): 76 (21 @ 08:00)  ABP: --  ABP(mean): --  RR: 21 (21 @ 08:00)  SpO2: 97% (21 @ 08:00)           @ 07:01  -   @ 07:00  --------------------------------------------------------  IN: 1511 mL / OUT: 600 mL / NET: 911 mL              LABS:  CBC Full  -  ( 13 May 2021 06:17 )  WBC Count : 6.39 K/uL  RBC Count : 4.21 M/uL  Hemoglobin : 14.3 g/dL  Hematocrit : 40.6 %  Platelet Count - Automated : 203 K/uL  Mean Cell Volume : 96.4 fl  Mean Cell Hemoglobin : 34.0 pg  Mean Cell Hemoglobin Concentration : 35.2 gm/dL  Auto Neutrophil # : x  Auto Lymphocyte # : x  Auto Monocyte # : x  Auto Eosinophil # : x  Auto Basophil # : x  Auto Neutrophil % : x  Auto Lymphocyte % : x  Auto Monocyte % : x  Auto Eosinophil % : x  Auto Basophil % : x    05-13    134<L>  |  93<L>  |  9.0  ----------------------------<  304<H>  3.8   |  27.0  |  0.88    Ca    9.9      13 May 2021 06:17  Phos  3.5     05-13  Mg     2.2     05-13    TPro  8.9<H>  /  Alb  5.0  /  TBili  0.8  /  DBili  x   /  AST  47<H>  /  ALT  54<H>  /  AlkPhos  66  05-12      Urinalysis Basic - ( 12 May 2021 19:13 )    Color: Yellow / Appearance: Clear / S.010 / pH: x  Gluc: x / Ketone: Large  / Bili: Negative / Urobili: Negative mg/dL   Blood: x / Protein: 15 mg/dL / Nitrite: Negative   Leuk Esterase: Negative / RBC: 0-2 /HPF / WBC 0-2   Sq Epi: x / Non Sq Epi: Occasional / Bacteria: Occasional        ____________________________________________________________________________________________________

## 2021-05-15 NOTE — DISCHARGE NOTE PROVIDER - CARE PROVIDER_API CALL
Freida Acosta)  EndocrinologyMetabDiabetes  [address], [place] [id number]  Phone: [phone]  Fax: [phone]  Follow Up Time:     Primary Care Doctor,   Phone: (   )    -  Fax: (   )    -  Follow Up Time:

## 2021-05-15 NOTE — DISCHARGE NOTE PROVIDER - NSDCCPCAREPLAN_GEN_ALL_CORE_FT
PRINCIPAL DISCHARGE DIAGNOSIS  Diagnosis: DKA (diabetic ketoacidosis)  Assessment and Plan of Treatment: - follow up with endo and pmd   - take medications as rx   - follow diabetic diet

## 2021-05-15 NOTE — DISCHARGE NOTE PROVIDER - NSDCMRMEDTOKEN_GEN_ALL_CORE_FT
alcohol swabs : Apply topically to affected area 4 times a day   atorvastatin 40 mg oral tablet: 1 tab(s) orally once a day (at bedtime)  glucometer (per patient&#x27;s insurance): Test blood sugars four times a day. Dispense #1 glucometer.  HumaLOG KwikPen 100 units/mL injectable solution: 15 unit(s) subcutaneous 3 times a day (with meals)   Insulin Pen Needles, 4mm: 1 application subcutaneously 4 times a day. ** Use with insulin pen **   lancets: 1 application subcutaneously 4 times a day   Lantus Solostar Pen 100 units/mL subcutaneous solution: 50 unit(s) subcutaneous once a day   test strips (per patient&#x27;s insurance): 1 application subcutaneously 4 times a day. ** Compatible with patient&#x27;s glucometer **

## 2021-05-19 LAB — GAD65 AB SER-MCNC: 0.07 NMOL/L — HIGH

## 2021-05-21 ENCOUNTER — NON-APPOINTMENT (OUTPATIENT)
Age: 41
End: 2021-05-21

## 2021-06-03 ENCOUNTER — APPOINTMENT (OUTPATIENT)
Dept: ENDOCRINOLOGY | Facility: CLINIC | Age: 41
End: 2021-06-03

## 2021-06-15 PROBLEM — Z78.9 OTHER SPECIFIED HEALTH STATUS: Chronic | Status: ACTIVE | Noted: 2021-05-12

## 2021-06-16 ENCOUNTER — APPOINTMENT (OUTPATIENT)
Dept: FAMILY MEDICINE | Facility: CLINIC | Age: 41
End: 2021-06-16
Payer: COMMERCIAL

## 2021-06-16 VITALS — RESPIRATION RATE: 14 BRPM | HEART RATE: 98 BPM | TEMPERATURE: 96.5 F | OXYGEN SATURATION: 98 %

## 2021-06-16 PROCEDURE — 99213 OFFICE O/P EST LOW 20 MIN: CPT

## 2021-06-21 RX ORDER — ELECTROLYTES/DEXTROSE
32G X 4 MM SOLUTION, ORAL ORAL
Qty: 3 | Refills: 2 | Status: ACTIVE | COMMUNITY
Start: 2021-05-15 | End: 1900-01-01

## 2021-06-21 RX ORDER — ISOPROPYL ALCOHOL 0.7 ML/ML
SWAB TOPICAL
Qty: 120 | Refills: 2 | Status: ACTIVE | COMMUNITY
Start: 2021-05-15 | End: 1900-01-01

## 2021-06-21 RX ORDER — ALCOHOL ANTISEPTIC PADS
PADS, MEDICATED (EA) TOPICAL
Qty: 3 | Refills: 2 | Status: ACTIVE | COMMUNITY
Start: 2021-06-18 | End: 1900-01-01

## 2021-06-21 RX ORDER — INSULIN LISPRO 100 [IU]/ML
100 INJECTION, SOLUTION INTRAVENOUS; SUBCUTANEOUS
Qty: 5 | Refills: 2 | Status: ACTIVE | COMMUNITY
Start: 2021-06-18 | End: 1900-01-01

## 2021-06-21 RX ORDER — BLOOD SUGAR DIAGNOSTIC
STRIP MISCELLANEOUS
Qty: 3 | Refills: 2 | Status: ACTIVE | COMMUNITY
Start: 2021-06-18 | End: 1900-01-01

## 2021-06-21 RX ORDER — INSULIN GLARGINE 100 [IU]/ML
100 INJECTION, SOLUTION SUBCUTANEOUS DAILY
Qty: 2 | Refills: 2 | Status: ACTIVE | COMMUNITY
Start: 2021-06-18 | End: 1900-01-01

## 2021-06-21 NOTE — ASSESSMENT
[FreeTextEntry1] : This is a note of dictation was 41-year-old male who came in today for a blood pressure recheck the patient feels well no complaints his pressure was 125/75 his heart had a regular rhythm S1 and S2 lungs were clear and the patient was told to return to the office in the near future

## 2021-06-28 ENCOUNTER — APPOINTMENT (OUTPATIENT)
Dept: FAMILY MEDICINE | Facility: CLINIC | Age: 41
End: 2021-06-28

## 2021-12-21 ENCOUNTER — RX RENEWAL (OUTPATIENT)
Age: 41
End: 2021-12-21

## 2021-12-22 ENCOUNTER — RX RENEWAL (OUTPATIENT)
Age: 41
End: 2021-12-22

## 2021-12-22 RX ORDER — ATORVASTATIN CALCIUM 40 MG/1
40 TABLET, FILM COATED ORAL
Qty: 90 | Refills: 1 | Status: ACTIVE | COMMUNITY
Start: 2021-05-15 | End: 1900-01-01

## 2022-02-12 ENCOUNTER — TRANSCRIPTION ENCOUNTER (OUTPATIENT)
Age: 42
End: 2022-02-12

## 2022-03-23 ENCOUNTER — TRANSCRIPTION ENCOUNTER (OUTPATIENT)
Age: 42
End: 2022-03-23

## 2023-03-27 ENCOUNTER — APPOINTMENT (OUTPATIENT)
Dept: FAMILY MEDICINE | Facility: CLINIC | Age: 43
End: 2023-03-27

## 2023-05-05 NOTE — H&P ADULT - ATTENDING COMMENTS
complains of pain/discomfort 41M w/ no significant PMHx was sent in from his PCPs office for elevated blood sugars. He has not been feeling well for the past few days. Admitted w/ DKA. Aggressive fluid resuscitation along w/ insulin gtt and electrolyte repletion. Check BMP q6 and lipid panel

## 2023-05-09 ENCOUNTER — NON-APPOINTMENT (OUTPATIENT)
Age: 43
End: 2023-05-09

## 2025-01-28 ENCOUNTER — OFFICE (OUTPATIENT)
Dept: URBAN - METROPOLITAN AREA CLINIC 63 | Facility: CLINIC | Age: 45
Setting detail: OPHTHALMOLOGY
End: 2025-01-28
Payer: COMMERCIAL

## 2025-01-28 ENCOUNTER — RX ONLY (RX ONLY)
Age: 45
End: 2025-01-28

## 2025-01-28 DIAGNOSIS — H40.013: ICD-10-CM

## 2025-01-28 DIAGNOSIS — H53.9: ICD-10-CM

## 2025-01-28 DIAGNOSIS — H16.223: ICD-10-CM

## 2025-01-28 PROCEDURE — 92004 COMPRE OPH EXAM NEW PT 1/>: CPT | Performed by: INTERNAL MEDICINE

## 2025-01-28 PROCEDURE — 92134 CPTRZ OPH DX IMG PST SGM RTA: CPT | Performed by: INTERNAL MEDICINE

## 2025-01-28 ASSESSMENT — REFRACTION_AUTOREFRACTION
OS_CYLINDER: -0.75
OD_CYLINDER: 0.00
OD_AXIS: 000
OD_SPHERE: -0.50
OS_AXIS: 164
OS_SPHERE: 0.00

## 2025-01-28 ASSESSMENT — TONOMETRY
OD_IOP_MMHG: 15
OS_IOP_MMHG: 15

## 2025-01-28 ASSESSMENT — SUPERFICIAL PUNCTATE KERATITIS (SPK)
OD_SPK: 1+
OS_SPK: 2+

## 2025-01-28 ASSESSMENT — KERATOMETRY
OD_K1POWER_DIOPTERS: 42.25
OS_K1POWER_DIOPTERS: 41.50
OD_K2POWER_DIOPTERS: 42.50
OD_AXISANGLE_DEGREES: 108
OS_K2POWER_DIOPTERS: 41.75
OS_AXISANGLE_DEGREES: 040

## 2025-01-28 ASSESSMENT — CONFRONTATIONAL VISUAL FIELD TEST (CVF)
OS_FINDINGS: FULL
OD_FINDINGS: FULL

## 2025-01-28 ASSESSMENT — VISUAL ACUITY
OS_BCVA: 20/30-1
OD_BCVA: 20/20